# Patient Record
Sex: FEMALE | Race: OTHER | Employment: FULL TIME | URBAN - METROPOLITAN AREA
[De-identification: names, ages, dates, MRNs, and addresses within clinical notes are randomized per-mention and may not be internally consistent; named-entity substitution may affect disease eponyms.]

---

## 2021-01-31 ENCOUNTER — IMMUNIZATION (OUTPATIENT)
Dept: LAB | Facility: HOSPITAL | Age: 26
End: 2021-01-31
Attending: EMERGENCY MEDICINE
Payer: MEDICAID

## 2021-01-31 DIAGNOSIS — Z23 NEED FOR VACCINATION: Primary | ICD-10-CM

## 2021-01-31 PROCEDURE — 0011A SARSCOV2 VAC 100MCG/0.5ML IM: CPT

## 2021-02-28 ENCOUNTER — IMMUNIZATION (OUTPATIENT)
Dept: LAB | Facility: HOSPITAL | Age: 26
End: 2021-02-28
Attending: EMERGENCY MEDICINE
Payer: MEDICAID

## 2021-02-28 DIAGNOSIS — Z23 NEED FOR VACCINATION: Primary | ICD-10-CM

## 2021-02-28 PROCEDURE — 0012A SARSCOV2 VAC 100MCG/0.5ML IM: CPT

## 2021-10-29 ENCOUNTER — IMMUNIZATION (OUTPATIENT)
Dept: LAB | Facility: HOSPITAL | Age: 26
End: 2021-10-29
Attending: EMERGENCY MEDICINE
Payer: MEDICAID

## 2021-10-29 DIAGNOSIS — Z23 NEED FOR VACCINATION: Primary | ICD-10-CM

## 2021-10-29 PROCEDURE — 0064A SARSCOV2 VAC 50MCG/0.25ML IM: CPT

## 2022-09-19 ENCOUNTER — OFFICE VISIT (OUTPATIENT)
Dept: FAMILY MEDICINE CLINIC | Facility: CLINIC | Age: 27
End: 2022-09-19
Payer: COMMERCIAL

## 2022-09-19 VITALS
DIASTOLIC BLOOD PRESSURE: 60 MMHG | OXYGEN SATURATION: 100 % | SYSTOLIC BLOOD PRESSURE: 92 MMHG | HEART RATE: 84 BPM | TEMPERATURE: 98 F | HEIGHT: 66.5 IN | BODY MASS INDEX: 37.16 KG/M2 | RESPIRATION RATE: 18 BRPM | WEIGHT: 234 LBS

## 2022-09-19 DIAGNOSIS — H57.89 IRRITATION OF RIGHT EYE: ICD-10-CM

## 2022-09-19 DIAGNOSIS — H01.00A BLEPHARITIS OF BOTH UPPER AND LOWER EYELID OF RIGHT EYE, UNSPECIFIED TYPE: Primary | ICD-10-CM

## 2022-09-19 DIAGNOSIS — F41.9 ANXIETY: ICD-10-CM

## 2022-09-19 RX ORDER — OLOPATADINE HYDROCHLORIDE 1 MG/ML
1 SOLUTION/ DROPS OPHTHALMIC 2 TIMES DAILY
Qty: 5 ML | Refills: 0 | Status: SHIPPED | OUTPATIENT
Start: 2022-09-19

## 2022-11-19 ENCOUNTER — OFFICE VISIT (OUTPATIENT)
Dept: FAMILY MEDICINE CLINIC | Facility: CLINIC | Age: 27
End: 2022-11-19
Payer: COMMERCIAL

## 2022-11-19 VITALS
WEIGHT: 230 LBS | BODY MASS INDEX: 36.53 KG/M2 | OXYGEN SATURATION: 98 % | HEIGHT: 66.5 IN | RESPIRATION RATE: 18 BRPM | SYSTOLIC BLOOD PRESSURE: 98 MMHG | HEART RATE: 98 BPM | DIASTOLIC BLOOD PRESSURE: 62 MMHG

## 2022-11-19 DIAGNOSIS — H93.13 TINNITUS OF BOTH EARS: ICD-10-CM

## 2022-11-19 DIAGNOSIS — M25.562 CHRONIC PAIN OF LEFT KNEE: ICD-10-CM

## 2022-11-19 DIAGNOSIS — Z23 NEED FOR VACCINATION: ICD-10-CM

## 2022-11-19 DIAGNOSIS — N92.6 IRREGULAR PERIODS: ICD-10-CM

## 2022-11-19 DIAGNOSIS — Z00.00 ANNUAL PHYSICAL EXAM: Primary | ICD-10-CM

## 2022-11-19 DIAGNOSIS — Z30.431 IUD CHECK UP: ICD-10-CM

## 2022-11-19 DIAGNOSIS — G89.29 CHRONIC PAIN OF LEFT KNEE: ICD-10-CM

## 2022-11-19 DIAGNOSIS — Z00.00 LABORATORY EXAM ORDERED AS PART OF ROUTINE GENERAL MEDICAL EXAMINATION: ICD-10-CM

## 2022-11-19 PROCEDURE — 3008F BODY MASS INDEX DOCD: CPT | Performed by: STUDENT IN AN ORGANIZED HEALTH CARE EDUCATION/TRAINING PROGRAM

## 2022-11-19 PROCEDURE — 90686 IIV4 VACC NO PRSV 0.5 ML IM: CPT | Performed by: STUDENT IN AN ORGANIZED HEALTH CARE EDUCATION/TRAINING PROGRAM

## 2022-11-19 PROCEDURE — G0438 PPPS, INITIAL VISIT: HCPCS | Performed by: STUDENT IN AN ORGANIZED HEALTH CARE EDUCATION/TRAINING PROGRAM

## 2022-11-19 PROCEDURE — 3078F DIAST BP <80 MM HG: CPT | Performed by: STUDENT IN AN ORGANIZED HEALTH CARE EDUCATION/TRAINING PROGRAM

## 2022-11-19 PROCEDURE — 90471 IMMUNIZATION ADMIN: CPT | Performed by: STUDENT IN AN ORGANIZED HEALTH CARE EDUCATION/TRAINING PROGRAM

## 2022-11-19 PROCEDURE — 99395 PREV VISIT EST AGE 18-39: CPT | Performed by: STUDENT IN AN ORGANIZED HEALTH CARE EDUCATION/TRAINING PROGRAM

## 2022-11-19 PROCEDURE — 99213 OFFICE O/P EST LOW 20 MIN: CPT | Performed by: STUDENT IN AN ORGANIZED HEALTH CARE EDUCATION/TRAINING PROGRAM

## 2022-11-19 PROCEDURE — 3074F SYST BP LT 130 MM HG: CPT | Performed by: STUDENT IN AN ORGANIZED HEALTH CARE EDUCATION/TRAINING PROGRAM

## 2023-01-05 ENCOUNTER — HOSPITAL ENCOUNTER (OUTPATIENT)
Dept: ULTRASOUND IMAGING | Age: 28
Discharge: HOME | End: 2023-01-05
Attending: STUDENT IN AN ORGANIZED HEALTH CARE EDUCATION/TRAINING PROGRAM
Payer: COMMERCIAL

## 2023-01-05 ENCOUNTER — LABORATORY ENCOUNTER (OUTPATIENT)
Dept: LAB | Age: 28
End: 2023-01-05
Attending: STUDENT IN AN ORGANIZED HEALTH CARE EDUCATION/TRAINING PROGRAM
Payer: COMMERCIAL

## 2023-01-05 DIAGNOSIS — N92.6 IRREGULAR PERIODS: ICD-10-CM

## 2023-01-05 DIAGNOSIS — Z00.00 LABORATORY EXAM ORDERED AS PART OF ROUTINE GENERAL MEDICAL EXAMINATION: ICD-10-CM

## 2023-01-05 DIAGNOSIS — Z00.00 ANNUAL PHYSICAL EXAM: ICD-10-CM

## 2023-01-05 DIAGNOSIS — Z30.431 IUD CHECK UP: ICD-10-CM

## 2023-01-05 LAB
ALBUMIN SERPL-MCNC: 3.6 G/DL (ref 3.4–5)
ALBUMIN/GLOB SERPL: 0.8 {RATIO} (ref 1–2)
ALP LIVER SERPL-CCNC: 86 U/L
ALT SERPL-CCNC: 33 U/L
ANION GAP SERPL CALC-SCNC: 3 MMOL/L (ref 0–18)
AST SERPL-CCNC: 26 U/L (ref 15–37)
BASOPHILS # BLD AUTO: 0.05 X10(3) UL (ref 0–0.2)
BASOPHILS NFR BLD AUTO: 0.4 %
BILIRUB SERPL-MCNC: 0.3 MG/DL (ref 0.1–2)
BUN BLD-MCNC: 12 MG/DL (ref 7–18)
CALCIUM BLD-MCNC: 9.5 MG/DL (ref 8.5–10.1)
CHLORIDE SERPL-SCNC: 107 MMOL/L (ref 98–112)
CHOLEST SERPL-MCNC: 170 MG/DL (ref ?–200)
CO2 SERPL-SCNC: 27 MMOL/L (ref 21–32)
CREAT BLD-MCNC: 0.83 MG/DL
EOSINOPHIL # BLD AUTO: 0.2 X10(3) UL (ref 0–0.7)
EOSINOPHIL NFR BLD AUTO: 1.5 %
ERYTHROCYTE [DISTWIDTH] IN BLOOD BY AUTOMATED COUNT: 13.4 %
EST. AVERAGE GLUCOSE BLD GHB EST-MCNC: 120 MG/DL (ref 68–126)
FASTING PATIENT LIPID ANSWER: YES
FASTING STATUS PATIENT QL REPORTED: YES
GFR SERPLBLD BASED ON 1.73 SQ M-ARVRAT: 98 ML/MIN/1.73M2 (ref 60–?)
GLOBULIN PLAS-MCNC: 4.5 G/DL (ref 2.8–4.4)
GLUCOSE BLD-MCNC: 113 MG/DL (ref 70–99)
HBA1C MFR BLD: 5.8 % (ref ?–5.7)
HCT VFR BLD AUTO: 44.4 %
HDLC SERPL-MCNC: 65 MG/DL (ref 40–59)
HGB BLD-MCNC: 13.9 G/DL
IMM GRANULOCYTES # BLD AUTO: 0.05 X10(3) UL (ref 0–1)
IMM GRANULOCYTES NFR BLD: 0.4 %
LDLC SERPL CALC-MCNC: 94 MG/DL (ref ?–100)
LYMPHOCYTES # BLD AUTO: 4.53 X10(3) UL (ref 1–4)
LYMPHOCYTES NFR BLD AUTO: 34.6 %
MCH RBC QN AUTO: 28.5 PG (ref 26–34)
MCHC RBC AUTO-ENTMCNC: 31.3 G/DL (ref 31–37)
MCV RBC AUTO: 91 FL
MONOCYTES # BLD AUTO: 0.65 X10(3) UL (ref 0.1–1)
MONOCYTES NFR BLD AUTO: 5 %
NEUTROPHILS # BLD AUTO: 7.63 X10 (3) UL (ref 1.5–7.7)
NEUTROPHILS # BLD AUTO: 7.63 X10(3) UL (ref 1.5–7.7)
NEUTROPHILS NFR BLD AUTO: 58.1 %
NONHDLC SERPL-MCNC: 105 MG/DL (ref ?–130)
OSMOLALITY SERPL CALC.SUM OF ELEC: 285 MOSM/KG (ref 275–295)
PLATELET # BLD AUTO: 360 10(3)UL (ref 150–450)
POTASSIUM SERPL-SCNC: 3.8 MMOL/L (ref 3.5–5.1)
PROT SERPL-MCNC: 8.1 G/DL (ref 6.4–8.2)
RBC # BLD AUTO: 4.88 X10(6)UL
SODIUM SERPL-SCNC: 137 MMOL/L (ref 136–145)
TESTOST SERPL-MCNC: 16.67 NG/DL
TRIGL SERPL-MCNC: 54 MG/DL (ref 30–149)
TSI SER-ACNC: 1.75 MIU/ML (ref 0.36–3.74)
VLDLC SERPL CALC-MCNC: 9 MG/DL (ref 0–30)
WBC # BLD AUTO: 13.1 X10(3) UL (ref 4–11)

## 2023-01-05 PROCEDURE — 80061 LIPID PANEL: CPT

## 2023-01-05 PROCEDURE — 76830 TRANSVAGINAL US NON-OB: CPT | Performed by: STUDENT IN AN ORGANIZED HEALTH CARE EDUCATION/TRAINING PROGRAM

## 2023-01-05 PROCEDURE — 83498 ASY HYDROXYPROGESTERONE 17-D: CPT

## 2023-01-05 PROCEDURE — 84403 ASSAY OF TOTAL TESTOSTERONE: CPT

## 2023-01-05 PROCEDURE — 36415 COLL VENOUS BLD VENIPUNCTURE: CPT

## 2023-01-05 PROCEDURE — 80053 COMPREHEN METABOLIC PANEL: CPT

## 2023-01-05 PROCEDURE — 93975 VASCULAR STUDY: CPT | Performed by: STUDENT IN AN ORGANIZED HEALTH CARE EDUCATION/TRAINING PROGRAM

## 2023-01-05 PROCEDURE — 85025 COMPLETE CBC W/AUTO DIFF WBC: CPT

## 2023-01-05 PROCEDURE — 83036 HEMOGLOBIN GLYCOSYLATED A1C: CPT

## 2023-01-05 PROCEDURE — 84443 ASSAY THYROID STIM HORMONE: CPT

## 2023-01-05 PROCEDURE — 76856 US EXAM PELVIC COMPLETE: CPT | Performed by: STUDENT IN AN ORGANIZED HEALTH CARE EDUCATION/TRAINING PROGRAM

## 2023-01-06 DIAGNOSIS — D72.829 LEUKOCYTOSIS, UNSPECIFIED TYPE: Primary | ICD-10-CM

## 2023-01-10 LAB — 17-HYDROPROGESTERONE QNT: 224.83 NG/DL

## 2023-01-12 ENCOUNTER — OFFICE VISIT (OUTPATIENT)
Dept: FAMILY MEDICINE CLINIC | Facility: CLINIC | Age: 28
End: 2023-01-12
Payer: COMMERCIAL

## 2023-01-12 VITALS
RESPIRATION RATE: 18 BRPM | BODY MASS INDEX: 37.01 KG/M2 | SYSTOLIC BLOOD PRESSURE: 104 MMHG | DIASTOLIC BLOOD PRESSURE: 78 MMHG | HEART RATE: 78 BPM | HEIGHT: 66.5 IN | WEIGHT: 233 LBS | TEMPERATURE: 98 F | OXYGEN SATURATION: 98 %

## 2023-01-12 DIAGNOSIS — B37.9 YEAST INFECTION: Primary | ICD-10-CM

## 2023-01-12 RX ORDER — FLUCONAZOLE 150 MG/1
150 TABLET ORAL ONCE
Qty: 2 TABLET | Refills: 0 | Status: SHIPPED | OUTPATIENT
Start: 2023-01-12 | End: 2023-01-12

## 2023-02-14 ENCOUNTER — LAB ENCOUNTER (OUTPATIENT)
Dept: LAB | Age: 28
End: 2023-02-14
Attending: STUDENT IN AN ORGANIZED HEALTH CARE EDUCATION/TRAINING PROGRAM
Payer: COMMERCIAL

## 2023-02-14 DIAGNOSIS — D72.829 LEUKOCYTOSIS, UNSPECIFIED TYPE: ICD-10-CM

## 2023-02-14 LAB
BASOPHILS # BLD AUTO: 0.04 X10(3) UL (ref 0–0.2)
BASOPHILS NFR BLD AUTO: 0.4 %
EOSINOPHIL # BLD AUTO: 0.14 X10(3) UL (ref 0–0.7)
EOSINOPHIL NFR BLD AUTO: 1.4 %
ERYTHROCYTE [DISTWIDTH] IN BLOOD BY AUTOMATED COUNT: 13 %
HCT VFR BLD AUTO: 41.4 %
HGB BLD-MCNC: 13.4 G/DL
IMM GRANULOCYTES # BLD AUTO: 0.02 X10(3) UL (ref 0–1)
IMM GRANULOCYTES NFR BLD: 0.2 %
LYMPHOCYTES # BLD AUTO: 4.22 X10(3) UL (ref 1–4)
LYMPHOCYTES NFR BLD AUTO: 41.7 %
MCH RBC QN AUTO: 29.5 PG (ref 26–34)
MCHC RBC AUTO-ENTMCNC: 32.4 G/DL (ref 31–37)
MCV RBC AUTO: 91.2 FL
MONOCYTES # BLD AUTO: 0.45 X10(3) UL (ref 0.1–1)
MONOCYTES NFR BLD AUTO: 4.4 %
NEUTROPHILS # BLD AUTO: 5.25 X10 (3) UL (ref 1.5–7.7)
NEUTROPHILS # BLD AUTO: 5.25 X10(3) UL (ref 1.5–7.7)
NEUTROPHILS NFR BLD AUTO: 51.9 %
PLATELET # BLD AUTO: 347 10(3)UL (ref 150–450)
RBC # BLD AUTO: 4.54 X10(6)UL
WBC # BLD AUTO: 10.1 X10(3) UL (ref 4–11)

## 2023-02-14 PROCEDURE — 85025 COMPLETE CBC W/AUTO DIFF WBC: CPT

## 2023-02-14 PROCEDURE — 36415 COLL VENOUS BLD VENIPUNCTURE: CPT

## 2023-03-02 ENCOUNTER — TELEPHONE (OUTPATIENT)
Dept: FAMILY MEDICINE CLINIC | Facility: CLINIC | Age: 28
End: 2023-03-02

## 2023-03-02 NOTE — TELEPHONE ENCOUNTER
Sx started yesterday   Home test positive today   Congested   Sore threat   Mild cough  No fever   Interested in [facility] an appt with      CDC guidelines advised  Discussed available therapeutics   Advised supportive measures at home --rest, hydration, Vit D, Zinc, Vit C, lozenges for sore throat, warm salty gargles, hot tea with honey/lemon, Mucinex for cough as needed, anti histamine daily as needed, saline spray, humidifier, steam bath for congestion, Tylenol alternate Ibu if with fever and body aches as needed. With verbalized understanding. Go to ER if with chest pain, SOB, worsening fatigue, low oxygen level, with verbalized understanding.      Call update in 2 days

## 2023-03-02 NOTE — TELEPHONE ENCOUNTER
COVID + now at home. Symptoms:  Stuffy/running nose, headache, cough, feels warm. Pt asking for advice.

## 2023-03-03 ENCOUNTER — TELEMEDICINE (OUTPATIENT)
Dept: FAMILY MEDICINE CLINIC | Facility: CLINIC | Age: 28
End: 2023-03-03
Payer: COMMERCIAL

## 2023-03-03 DIAGNOSIS — U07.1 COVID: Primary | ICD-10-CM

## 2023-03-03 PROCEDURE — 99213 OFFICE O/P EST LOW 20 MIN: CPT | Performed by: STUDENT IN AN ORGANIZED HEALTH CARE EDUCATION/TRAINING PROGRAM

## 2023-03-03 RX ORDER — FLUTICASONE PROPIONATE 50 MCG
2 SPRAY, SUSPENSION (ML) NASAL DAILY PRN
Qty: 16 G | Refills: 0 | Status: SHIPPED | OUTPATIENT
Start: 2023-03-03

## 2023-03-03 RX ORDER — AZELASTINE 1 MG/ML
2 SPRAY, METERED NASAL NIGHTLY PRN
Qty: 30 ML | Refills: 0 | Status: SHIPPED | OUTPATIENT
Start: 2023-03-03

## 2023-04-11 ENCOUNTER — OFFICE VISIT (OUTPATIENT)
Facility: CLINIC | Age: 28
End: 2023-04-11
Payer: COMMERCIAL

## 2023-04-11 VITALS
SYSTOLIC BLOOD PRESSURE: 112 MMHG | HEART RATE: 91 BPM | WEIGHT: 229 LBS | HEIGHT: 66.5 IN | DIASTOLIC BLOOD PRESSURE: 68 MMHG | BODY MASS INDEX: 36.37 KG/M2

## 2023-04-11 DIAGNOSIS — Z01.419 ENCOUNTER FOR ANNUAL ROUTINE GYNECOLOGICAL EXAMINATION: Primary | ICD-10-CM

## 2023-04-11 DIAGNOSIS — T83.32XA INTRAUTERINE CONTRACEPTIVE DEVICE THREADS LOST, INITIAL ENCOUNTER: ICD-10-CM

## 2023-04-11 DIAGNOSIS — N76.0 VAGINITIS AND VULVOVAGINITIS: ICD-10-CM

## 2023-04-11 PROCEDURE — 87491 CHLMYD TRACH DNA AMP PROBE: CPT | Performed by: STUDENT IN AN ORGANIZED HEALTH CARE EDUCATION/TRAINING PROGRAM

## 2023-04-11 PROCEDURE — 87660 TRICHOMONAS VAGIN DIR PROBE: CPT | Performed by: STUDENT IN AN ORGANIZED HEALTH CARE EDUCATION/TRAINING PROGRAM

## 2023-04-11 PROCEDURE — 87480 CANDIDA DNA DIR PROBE: CPT | Performed by: STUDENT IN AN ORGANIZED HEALTH CARE EDUCATION/TRAINING PROGRAM

## 2023-04-11 PROCEDURE — 87591 N.GONORRHOEAE DNA AMP PROB: CPT | Performed by: STUDENT IN AN ORGANIZED HEALTH CARE EDUCATION/TRAINING PROGRAM

## 2023-04-11 PROCEDURE — 87624 HPV HI-RISK TYP POOLED RSLT: CPT | Performed by: STUDENT IN AN ORGANIZED HEALTH CARE EDUCATION/TRAINING PROGRAM

## 2023-04-11 PROCEDURE — 87510 GARDNER VAG DNA DIR PROBE: CPT | Performed by: STUDENT IN AN ORGANIZED HEALTH CARE EDUCATION/TRAINING PROGRAM

## 2023-04-12 LAB
C TRACH DNA SPEC QL NAA+PROBE: NEGATIVE
HPV I/H RISK 1 DNA SPEC QL NAA+PROBE: NEGATIVE
N GONORRHOEA DNA SPEC QL NAA+PROBE: NEGATIVE

## 2023-04-14 RX ORDER — METRONIDAZOLE 7.5 MG/G
1 GEL VAGINAL NIGHTLY
Qty: 70 G | Refills: 0 | Status: SHIPPED | OUTPATIENT
Start: 2023-04-14 | End: 2023-04-19

## 2023-04-19 ENCOUNTER — TELEPHONE (OUTPATIENT)
Dept: FAMILY MEDICINE CLINIC | Facility: CLINIC | Age: 28
End: 2023-04-19

## 2023-04-19 ENCOUNTER — APPOINTMENT (OUTPATIENT)
Dept: ULTRASOUND IMAGING | Age: 28
End: 2023-04-19
Attending: NURSE PRACTITIONER
Payer: COMMERCIAL

## 2023-04-19 ENCOUNTER — HOSPITAL ENCOUNTER (OUTPATIENT)
Age: 28
Discharge: HOME | End: 2023-04-19
Payer: COMMERCIAL

## 2023-04-19 VITALS
WEIGHT: 229 LBS | SYSTOLIC BLOOD PRESSURE: 120 MMHG | BODY MASS INDEX: 36.37 KG/M2 | DIASTOLIC BLOOD PRESSURE: 79 MMHG | OXYGEN SATURATION: 99 % | HEART RATE: 89 BPM | HEIGHT: 66.5 IN | TEMPERATURE: 98 F | RESPIRATION RATE: 16 BRPM

## 2023-04-19 DIAGNOSIS — M79.89 LEG SWELLING: Primary | ICD-10-CM

## 2023-04-19 PROCEDURE — 93971 EXTREMITY STUDY: CPT | Performed by: NURSE PRACTITIONER

## 2023-04-19 PROCEDURE — 99214 OFFICE O/P EST MOD 30 MIN: CPT

## 2023-04-19 PROCEDURE — 99203 OFFICE O/P NEW LOW 30 MIN: CPT

## 2023-04-19 NOTE — TELEPHONE ENCOUNTER
Below knee/ankle/foot swollen and puffy last night; has subsided today. Pt was on an airplane for about 4 hours yesterday.

## 2023-04-19 NOTE — TELEPHONE ENCOUNTER
Urgent care for evaluation for DVT with ultrasound, may need antibiotics if they note any signs of cellulitis.      Ap Butts MD, 04/19/23, 10:41 AM

## 2023-04-19 NOTE — ED INITIAL ASSESSMENT (HPI)
Patient reports she returned from a flight yesterday from [place] and noticed bilateral ankle swelling. Patient reports the right was more swollen.

## 2023-04-19 NOTE — TELEPHONE ENCOUNTER
Since last night   R leg swelling   L leg minimal   No redness   Feels warm R leg last night  No pain , no calf pain when stretched or when walking     Air flight Sat 4/15  2 hrs    Flight 4 hrs last night    With IUD  Since 12/30/21   No oral pills     No SOB, chest pain or palpitations  No hx of blood clots in the past    Are you able to see/add on today, (her day off) or UC today?      Pls advise   Thank you

## 2023-04-24 ENCOUNTER — TELEPHONE (OUTPATIENT)
Facility: CLINIC | Age: 28
End: 2023-04-24

## 2023-04-24 ENCOUNTER — LAB ENCOUNTER (OUTPATIENT)
Dept: LAB | Age: 28
End: 2023-04-24
Attending: STUDENT IN AN ORGANIZED HEALTH CARE EDUCATION/TRAINING PROGRAM
Payer: COMMERCIAL

## 2023-04-24 DIAGNOSIS — R35.0 URINARY FREQUENCY: Primary | ICD-10-CM

## 2023-04-24 DIAGNOSIS — R35.0 URINARY FREQUENCY: ICD-10-CM

## 2023-04-24 LAB
BILIRUB UR QL STRIP.AUTO: NEGATIVE
COLOR UR AUTO: YELLOW
GLUCOSE UR STRIP.AUTO-MCNC: NEGATIVE MG/DL
KETONES UR STRIP.AUTO-MCNC: NEGATIVE MG/DL
NITRITE UR QL STRIP.AUTO: NEGATIVE
PH UR STRIP.AUTO: 5 [PH] (ref 5–8)
PROT UR STRIP.AUTO-MCNC: NEGATIVE MG/DL
SP GR UR STRIP.AUTO: 1.01 (ref 1–1.03)
UROBILINOGEN UR STRIP.AUTO-MCNC: <2 MG/DL
WBC #/AREA URNS AUTO: >50 /HPF
WBC CLUMPS UR QL AUTO: PRESENT /HPF

## 2023-04-24 PROCEDURE — 87077 CULTURE AEROBIC IDENTIFY: CPT

## 2023-04-24 PROCEDURE — 81001 URINALYSIS AUTO W/SCOPE: CPT

## 2023-04-24 PROCEDURE — 87186 SC STD MICRODIL/AGAR DIL: CPT

## 2023-04-24 PROCEDURE — 87086 URINE CULTURE/COLONY COUNT: CPT

## 2023-04-24 NOTE — TELEPHONE ENCOUNTER
Spoke with pt. She completed Metrogel treatment last night. She C/O an odor to her urine and urinary frequency. Offered pt an appointment this afternoon. Unable to come in due to her work schedule. No available appointments available tomorrow that work with her work schedule. I let pt know I would get a message to Dr. Li So to see if pt could go by an [facility] lab to drop of a urine specimen and call back with recommendations. Orders pended. Verbalized understanding.

## 2023-04-24 NOTE — TELEPHONE ENCOUNTER
Spoke with pt. Aware she can give urine sample, orders have been signed. May still take time for metrogel to fully resolve symptoms. If urine culture negative and still symptomatic can also try PO flagyl instead of Metrogel. Verbalized understanding.

## 2023-04-24 NOTE — TELEPHONE ENCOUNTER
Per pt she just called because she has finished her meds and still is experiencing some itching, and also some change of odor in her urine. She is not sure if Dr DUNBAR wants to see her or not. Please advise and call pt.  Thanks

## 2023-04-25 RX ORDER — NITROFURANTOIN 25; 75 MG/1; MG/1
100 CAPSULE ORAL 2 TIMES DAILY
Qty: 14 CAPSULE | Refills: 0 | Status: SHIPPED | OUTPATIENT
Start: 2023-04-25 | End: 2023-05-02

## 2023-04-26 NOTE — PROGRESS NOTES
Discussed providers message: results and recommendation Rx macrobid, patient verbalized understanding.

## 2023-06-10 ENCOUNTER — HOSPITAL ENCOUNTER (OUTPATIENT)
Age: 28
Discharge: HOME | End: 2023-06-10
Payer: COMMERCIAL

## 2023-06-10 VITALS
DIASTOLIC BLOOD PRESSURE: 70 MMHG | OXYGEN SATURATION: 96 % | SYSTOLIC BLOOD PRESSURE: 139 MMHG | HEART RATE: 106 BPM | RESPIRATION RATE: 18 BRPM | WEIGHT: 230 LBS | BODY MASS INDEX: 37 KG/M2 | TEMPERATURE: 97 F

## 2023-06-10 DIAGNOSIS — J01.90 ACUTE SINUSITIS, RECURRENCE NOT SPECIFIED, UNSPECIFIED LOCATION: Primary | ICD-10-CM

## 2023-06-10 DIAGNOSIS — J34.89 SINUS PRESSURE: ICD-10-CM

## 2023-06-10 DIAGNOSIS — R09.81 SINUS CONGESTION: ICD-10-CM

## 2023-06-10 PROCEDURE — 99213 OFFICE O/P EST LOW 20 MIN: CPT

## 2023-06-10 NOTE — DISCHARGE INSTRUCTIONS
See attached information    Wash hands often  Disinfect your environment, linens, electronics, etc.  Drink plenty of fluids (water, Pedialyte, etc.)  Get plenty of rest  Do not share utensils or drinks  Salt water gargles throughout the day for sore throat  You may benefit from spoonfuls of honey throughout the day for cough  You may benefit from taking a decongestant (e.g. Sudafed - pseudoephedrine [behind the pharmacy counter])  You may benefit from using a humidifier and/or steam showers  You may benefit from Flonase nasal spray daily  You may benefit from taking Zyrtec daily until Flonase has been used for 1-2 weeks.   Avoid having air blow on your face as this can worsen congestion  Symptoms may take a few weeks to resolve

## 2023-06-10 NOTE — ED INITIAL ASSESSMENT (HPI)
C/o productive cough with green sputum for over 11 days. Pt reports runny/stiffy nose. Pt reports nasal congestion and bilateral ear pressure. Pt reports otc meds for symptoms.

## 2023-06-17 ENCOUNTER — TELEPHONE (OUTPATIENT)
Dept: FAMILY MEDICINE CLINIC | Facility: CLINIC | Age: 28
End: 2023-06-17

## 2023-06-22 ENCOUNTER — OFFICE VISIT (OUTPATIENT)
Dept: FAMILY MEDICINE CLINIC | Facility: CLINIC | Age: 28
End: 2023-06-22
Payer: COMMERCIAL

## 2023-06-22 VITALS
OXYGEN SATURATION: 98 % | BODY MASS INDEX: 36.37 KG/M2 | DIASTOLIC BLOOD PRESSURE: 70 MMHG | HEART RATE: 92 BPM | SYSTOLIC BLOOD PRESSURE: 110 MMHG | HEIGHT: 66.5 IN | WEIGHT: 229 LBS | RESPIRATION RATE: 19 BRPM

## 2023-06-22 DIAGNOSIS — B37.9 YEAST INFECTION: ICD-10-CM

## 2023-06-22 DIAGNOSIS — R30.0 DYSURIA: ICD-10-CM

## 2023-06-22 DIAGNOSIS — N89.8 VAGINAL DISCHARGE: Primary | ICD-10-CM

## 2023-06-22 PROCEDURE — 87086 URINE CULTURE/COLONY COUNT: CPT | Performed by: STUDENT IN AN ORGANIZED HEALTH CARE EDUCATION/TRAINING PROGRAM

## 2023-06-22 PROCEDURE — 87480 CANDIDA DNA DIR PROBE: CPT | Performed by: STUDENT IN AN ORGANIZED HEALTH CARE EDUCATION/TRAINING PROGRAM

## 2023-06-22 PROCEDURE — 3074F SYST BP LT 130 MM HG: CPT | Performed by: STUDENT IN AN ORGANIZED HEALTH CARE EDUCATION/TRAINING PROGRAM

## 2023-06-22 PROCEDURE — 87660 TRICHOMONAS VAGIN DIR PROBE: CPT | Performed by: STUDENT IN AN ORGANIZED HEALTH CARE EDUCATION/TRAINING PROGRAM

## 2023-06-22 PROCEDURE — 87510 GARDNER VAG DNA DIR PROBE: CPT | Performed by: STUDENT IN AN ORGANIZED HEALTH CARE EDUCATION/TRAINING PROGRAM

## 2023-06-22 PROCEDURE — 3078F DIAST BP <80 MM HG: CPT | Performed by: STUDENT IN AN ORGANIZED HEALTH CARE EDUCATION/TRAINING PROGRAM

## 2023-06-22 PROCEDURE — 99213 OFFICE O/P EST LOW 20 MIN: CPT | Performed by: STUDENT IN AN ORGANIZED HEALTH CARE EDUCATION/TRAINING PROGRAM

## 2023-06-22 PROCEDURE — 3008F BODY MASS INDEX DOCD: CPT | Performed by: STUDENT IN AN ORGANIZED HEALTH CARE EDUCATION/TRAINING PROGRAM

## 2023-06-22 RX ORDER — FLUCONAZOLE 150 MG/1
150 TABLET ORAL ONCE
Qty: 2 TABLET | Refills: 0 | Status: SHIPPED | OUTPATIENT
Start: 2023-06-22 | End: 2023-06-22

## 2023-06-27 ENCOUNTER — TELEPHONE (OUTPATIENT)
Dept: FAMILY MEDICINE CLINIC | Facility: CLINIC | Age: 28
End: 2023-06-27

## 2023-10-28 ENCOUNTER — HOSPITAL ENCOUNTER (OUTPATIENT)
Age: 28
Discharge: HOME | End: 2023-10-28
Payer: COMMERCIAL

## 2023-10-28 ENCOUNTER — HOSPITAL ENCOUNTER (OUTPATIENT)
Dept: GENERAL RADIOLOGY | Age: 28
Discharge: HOME | End: 2023-10-28
Attending: STUDENT IN AN ORGANIZED HEALTH CARE EDUCATION/TRAINING PROGRAM

## 2023-10-28 VITALS
TEMPERATURE: 97 F | HEART RATE: 74 BPM | RESPIRATION RATE: 20 BRPM | BODY MASS INDEX: 36.48 KG/M2 | DIASTOLIC BLOOD PRESSURE: 66 MMHG | WEIGHT: 227 LBS | HEIGHT: 66 IN | OXYGEN SATURATION: 99 % | SYSTOLIC BLOOD PRESSURE: 106 MMHG

## 2023-10-28 DIAGNOSIS — G89.29 CHRONIC PAIN OF LEFT KNEE: ICD-10-CM

## 2023-10-28 DIAGNOSIS — B37.31 VAGINAL CANDIDIASIS: Primary | ICD-10-CM

## 2023-10-28 DIAGNOSIS — M25.562 CHRONIC PAIN OF LEFT KNEE: ICD-10-CM

## 2023-10-28 LAB
B-HCG UR QL: NEGATIVE
BILIRUB UR QL STRIP: NEGATIVE
CLARITY UR: CLEAR
COLOR UR: YELLOW
GLUCOSE UR STRIP-MCNC: NEGATIVE MG/DL
KETONES UR STRIP-MCNC: NEGATIVE MG/DL
LEUKOCYTE ESTERASE UR QL STRIP: NEGATIVE
NITRITE UR QL STRIP: NEGATIVE
PH UR STRIP: 6 [PH]
PROT UR STRIP-MCNC: NEGATIVE MG/DL
SP GR UR STRIP: >=1.03
UROBILINOGEN UR STRIP-ACNC: <2 MG/DL

## 2023-10-28 PROCEDURE — 87660 TRICHOMONAS VAGIN DIR PROBE: CPT | Performed by: PHYSICIAN ASSISTANT

## 2023-10-28 PROCEDURE — 99214 OFFICE O/P EST MOD 30 MIN: CPT

## 2023-10-28 PROCEDURE — 81002 URINALYSIS NONAUTO W/O SCOPE: CPT

## 2023-10-28 PROCEDURE — 87491 CHLMYD TRACH DNA AMP PROBE: CPT | Performed by: PHYSICIAN ASSISTANT

## 2023-10-28 PROCEDURE — 87510 GARDNER VAG DNA DIR PROBE: CPT | Performed by: PHYSICIAN ASSISTANT

## 2023-10-28 PROCEDURE — 73562 X-RAY EXAM OF KNEE 3: CPT | Performed by: STUDENT IN AN ORGANIZED HEALTH CARE EDUCATION/TRAINING PROGRAM

## 2023-10-28 PROCEDURE — 81025 URINE PREGNANCY TEST: CPT

## 2023-10-28 PROCEDURE — 87480 CANDIDA DNA DIR PROBE: CPT | Performed by: PHYSICIAN ASSISTANT

## 2023-10-28 PROCEDURE — 87591 N.GONORRHOEAE DNA AMP PROB: CPT | Performed by: PHYSICIAN ASSISTANT

## 2023-10-28 RX ORDER — FLUCONAZOLE 150 MG/1
TABLET ORAL
Qty: 2 TABLET | Refills: 0 | Status: SHIPPED | OUTPATIENT
Start: 2023-10-28

## 2023-10-28 RX ORDER — NYSTATIN 100000 U/G
1 OINTMENT TOPICAL 2 TIMES DAILY
Qty: 30 G | Refills: 0 | Status: SHIPPED | OUTPATIENT
Start: 2023-10-28

## 2023-10-28 NOTE — DISCHARGE INSTRUCTIONS
NO Sexual intercourse until results are received and all symptoms have resolved    Start on medication as prescribed

## 2023-10-30 LAB
C TRACH DNA SPEC QL NAA+PROBE: NEGATIVE
N GONORRHOEA DNA SPEC QL NAA+PROBE: NEGATIVE

## 2024-02-02 PROBLEM — R73.9 HYPERGLYCEMIA: Status: ACTIVE | Noted: 2019-03-26

## 2024-02-02 PROBLEM — R30.0 DYSURIA: Status: ACTIVE | Noted: 2018-06-02

## 2024-02-02 PROBLEM — N76.0 ACUTE VAGINITIS: Status: ACTIVE | Noted: 2018-06-02

## 2024-02-03 ENCOUNTER — OFFICE VISIT (OUTPATIENT)
Dept: FAMILY MEDICINE CLINIC | Facility: CLINIC | Age: 29
End: 2024-02-03
Payer: COMMERCIAL

## 2024-02-03 VITALS
HEIGHT: 66 IN | DIASTOLIC BLOOD PRESSURE: 66 MMHG | WEIGHT: 222 LBS | BODY MASS INDEX: 35.68 KG/M2 | RESPIRATION RATE: 19 BRPM | HEART RATE: 90 BPM | OXYGEN SATURATION: 99 % | SYSTOLIC BLOOD PRESSURE: 110 MMHG

## 2024-02-03 DIAGNOSIS — G43.829 PREMENSTRUAL MIGRAINE: ICD-10-CM

## 2024-02-03 DIAGNOSIS — R06.83 SNORING: ICD-10-CM

## 2024-02-03 DIAGNOSIS — Z00.00 LABORATORY EXAM ORDERED AS PART OF ROUTINE GENERAL MEDICAL EXAMINATION: ICD-10-CM

## 2024-02-03 DIAGNOSIS — Z23 NEED FOR VACCINATION: ICD-10-CM

## 2024-02-03 DIAGNOSIS — M25.562 CHRONIC PAIN OF LEFT KNEE: ICD-10-CM

## 2024-02-03 DIAGNOSIS — Z00.00 ANNUAL PHYSICAL EXAM: Primary | ICD-10-CM

## 2024-02-03 DIAGNOSIS — G89.29 CHRONIC PAIN OF LEFT KNEE: ICD-10-CM

## 2024-02-03 LAB
ALBUMIN SERPL-MCNC: 4.2 G/DL (ref 3.4–5)
ALBUMIN/GLOB SERPL: 0.9 {RATIO} (ref 1–2)
ALP LIVER SERPL-CCNC: 82 U/L
ALT SERPL-CCNC: 22 U/L
ANION GAP SERPL CALC-SCNC: 5 MMOL/L (ref 0–18)
AST SERPL-CCNC: 23 U/L (ref 15–37)
BASOPHILS # BLD AUTO: 0.04 X10(3) UL (ref 0–0.2)
BASOPHILS NFR BLD AUTO: 0.5 %
BILIRUB SERPL-MCNC: 0.5 MG/DL (ref 0.1–2)
BUN BLD-MCNC: 10 MG/DL (ref 9–23)
CALCIUM BLD-MCNC: 9.7 MG/DL (ref 8.5–10.1)
CHLORIDE SERPL-SCNC: 108 MMOL/L (ref 98–112)
CHOLEST SERPL-MCNC: 193 MG/DL (ref ?–200)
CO2 SERPL-SCNC: 23 MMOL/L (ref 21–32)
CREAT BLD-MCNC: 0.81 MG/DL
EGFRCR SERPLBLD CKD-EPI 2021: 100 ML/MIN/1.73M2 (ref 60–?)
EOSINOPHIL # BLD AUTO: 0.14 X10(3) UL (ref 0–0.7)
EOSINOPHIL NFR BLD AUTO: 1.8 %
ERYTHROCYTE [DISTWIDTH] IN BLOOD BY AUTOMATED COUNT: 12.9 %
EST. AVERAGE GLUCOSE BLD GHB EST-MCNC: 126 MG/DL (ref 68–126)
FASTING PATIENT LIPID ANSWER: YES
FASTING STATUS PATIENT QL REPORTED: YES
GLOBULIN PLAS-MCNC: 4.5 G/DL (ref 2.8–4.4)
GLUCOSE BLD-MCNC: 101 MG/DL (ref 70–99)
HBA1C MFR BLD: 6 % (ref ?–5.7)
HCT VFR BLD AUTO: 46.3 %
HDLC SERPL-MCNC: 71 MG/DL (ref 40–59)
HGB BLD-MCNC: 15.2 G/DL
IMM GRANULOCYTES # BLD AUTO: 0.02 X10(3) UL (ref 0–1)
IMM GRANULOCYTES NFR BLD: 0.3 %
LDLC SERPL CALC-MCNC: 112 MG/DL (ref ?–100)
LYMPHOCYTES # BLD AUTO: 3.85 X10(3) UL (ref 1–4)
LYMPHOCYTES NFR BLD AUTO: 48.2 %
MCH RBC QN AUTO: 29 PG (ref 26–34)
MCHC RBC AUTO-ENTMCNC: 32.8 G/DL (ref 31–37)
MCV RBC AUTO: 88.2 FL
MONOCYTES # BLD AUTO: 0.35 X10(3) UL (ref 0.1–1)
MONOCYTES NFR BLD AUTO: 4.4 %
NEUTROPHILS # BLD AUTO: 3.58 X10 (3) UL (ref 1.5–7.7)
NEUTROPHILS # BLD AUTO: 3.58 X10(3) UL (ref 1.5–7.7)
NEUTROPHILS NFR BLD AUTO: 44.8 %
NONHDLC SERPL-MCNC: 122 MG/DL (ref ?–130)
OSMOLALITY SERPL CALC.SUM OF ELEC: 281 MOSM/KG (ref 275–295)
PLATELET # BLD AUTO: 369 10(3)UL (ref 150–450)
POTASSIUM SERPL-SCNC: 4 MMOL/L (ref 3.5–5.1)
PROT SERPL-MCNC: 8.7 G/DL (ref 6.4–8.2)
RBC # BLD AUTO: 5.25 X10(6)UL
SODIUM SERPL-SCNC: 136 MMOL/L (ref 136–145)
TRIGL SERPL-MCNC: 51 MG/DL (ref 30–149)
TSI SER-ACNC: 1.21 MIU/ML (ref 0.36–3.74)
VLDLC SERPL CALC-MCNC: 9 MG/DL (ref 0–30)
WBC # BLD AUTO: 8 X10(3) UL (ref 4–11)

## 2024-02-03 PROCEDURE — 84443 ASSAY THYROID STIM HORMONE: CPT | Performed by: STUDENT IN AN ORGANIZED HEALTH CARE EDUCATION/TRAINING PROGRAM

## 2024-02-03 PROCEDURE — 80061 LIPID PANEL: CPT | Performed by: STUDENT IN AN ORGANIZED HEALTH CARE EDUCATION/TRAINING PROGRAM

## 2024-02-03 PROCEDURE — 83036 HEMOGLOBIN GLYCOSYLATED A1C: CPT | Performed by: STUDENT IN AN ORGANIZED HEALTH CARE EDUCATION/TRAINING PROGRAM

## 2024-02-03 PROCEDURE — 80053 COMPREHEN METABOLIC PANEL: CPT | Performed by: STUDENT IN AN ORGANIZED HEALTH CARE EDUCATION/TRAINING PROGRAM

## 2024-02-03 PROCEDURE — 85025 COMPLETE CBC W/AUTO DIFF WBC: CPT | Performed by: STUDENT IN AN ORGANIZED HEALTH CARE EDUCATION/TRAINING PROGRAM

## 2024-02-03 NOTE — PROGRESS NOTES
[facility] Office Note    HPI:     Zak Malave is a 28 year old female presenting for annual exam and follow-up of knee pain, pre-menstrual migraines, and snoring.     Pre-menstrual migraines  Has noted migraines more often with Kyleena IUD, particularly around her periods. Described as pulsatile bilateral head pressure like sensation with sensitivity to light.    Chronic left knee pain  Patient also following-up with knee pain. Prior x-rays negative. Patient endorses that the knee still feels \"crunchy\" and has been progressively worse. Patient did not f/u with physical therapy referral previously provided.     Snoring  Has been told by both her boyfriend and her mother that she snores heavily. Her boyfriend has apparently noted that she also has had times where it seems like her breathing has stopped during sleep.     Diet: cutting down on sugary beverages, 1-2 servings of veggies per day, lean proteins   Exercise: no regular exercise currently  Tobacco: denies   Alcohol: 1-2 drinks in a month   Other Drugs: denies     Aspirin use? (age 50-59 with ASCVD risk 10% or greater): not indicated  Breast cancer (biennial age 50-74, individualized choice age 40-49): not indicated  Cervical cancer screen? (q3 yrs age 21-29, q3 or q5 w/HPV cotesting age 30-65): sees gyne   Colonoscopy screen? (age 45-75 or earlier based on risk): not indicated  Depression screen? (PHQ-2 or PHQ-9): PHQ-2 Score of 0  Diabetes screen? (age 35 to 70 who are overweight or obese): A1c ordered  Fall Prevention? (age 65 and older): not indicated  Lipid panel? (age 20-45 with increased risk of CHD or older than 45): ordered  Lung cancer screen if 50-80 w/ 20 pack year smoking history and currently smoking or quit in last 15 yrs? not indicated  Osteoporosis screen? (DEXA in postmenopausal 65 or older at increased risk): not indicated    HISTORY:  Past Medical History:   Diagnosis Date    Fracture of left leg     at age  7-8      Past Surgical History:   Procedure Laterality Date    APPENDECTOMY      done as child around age 5 or 6    WISDOM TEETH REMOVED        Family History   Problem Relation Age of Onset    Heart Disease Father     Glaucoma Father     Depression Mother     Other (Rheumatoid arthritis) Maternal Grandmother     Prostate Cancer Maternal Grandfather         dx age 60s    Glaucoma Paternal Grandmother     Diabetes Paternal Grandfather     Breast Cancer Neg     Ovarian Cancer Neg     Uterine Cancer Neg     Pancreatic Cancer Neg     Colon Cancer Neg       Social History:   Social History     Socioeconomic History    Marital status: Single   Tobacco Use    Smoking status: Never     Passive exposure: Never    Smokeless tobacco: Never   Vaping Use    Vaping Use: Never used   Substance and Sexual Activity    Alcohol use: Yes     Comment: social    Drug use: Never    Sexual activity: Yes     Partners: Male        Medications (Active prior to today's visit):  Current Outpatient Medications   Medication Sig Dispense Refill    Levonorgestrel (KYLEENA IU) by Intrauterine route.      fluconazole 150 MG Oral Tab Take one tab day 1 if still symptomatic take second tab in 72 hours (Patient not taking: Reported on 2/3/2024) 2 tablet 0    nystatin 100,000 Units/g External Ointment Apply 1 Application topically 2 (two) times daily. (Patient not taking: Reported on 2/3/2024) 30 g 0       Allergies:  Allergies   Allergen Reactions    Seasonal OTHER (SEE COMMENTS)     sneezing         ROS:   Review of Systems   Constitutional:  Negative for chills and fever.   Respiratory:          +snoring during sleep   Musculoskeletal:         +chronic left knee pain   Neurological:         +pre-menstrual migraines     Otherwise see HPI    PHYSICAL EXAM:   /66 (BP Location: Right arm, Patient Position: Sitting, Cuff Size: large)   Pulse 90   Resp 19   Ht 5' 6\" (1.676 m)   Wt 222 lb (100.7 kg)   LMP 01/28/2024 (Approximate)   SpO2 99%    BMI 35.83 kg/m²  Estimated body mass index is 35.83 kg/m² as calculated from the following:    Height as of this encounter: 5' 6\" (1.676 m).    Weight as of this encounter: 222 lb (100.7 kg).   Vital signs reviewed.Appears stated age, well groomed.    Physical Exam  Constitutional:       General: She is not in acute distress.  HENT:      Right Ear: External ear normal.      Left Ear: External ear normal.      Nose: Nose normal.      Mouth/Throat:      Mouth: Mucous membranes are moist.      Pharynx: Oropharynx is clear.   Eyes:      Conjunctiva/sclera: Conjunctivae normal.      Pupils: Pupils are equal, round, and reactive to light.   Cardiovascular:      Rate and Rhythm: Normal rate and regular rhythm.      Heart sounds: Normal heart sounds.   Pulmonary:      Effort: Pulmonary effort is normal.      Breath sounds: Normal breath sounds.   Abdominal:      General: Bowel sounds are normal.      Palpations: Abdomen is soft.      Tenderness: There is no abdominal tenderness. There is no guarding or rebound.   Musculoskeletal:      Comments: +No tenderness to palpation of left knee. No significant edema appreciated of knee. ROM of knee intact. Negative anterior and posterior Lachman's test. Overall negative Juan's test but patient did note some \"popping\" and \"catching\" sensation when performed with internal rotation.      Lymphadenopathy:      Cervical: No cervical adenopathy.   Neurological:      Mental Status: She is alert.           ASSESSMENT/PLAN:      28 year old female presenting for annual exam and follow-up of knee pain, pre-menstrual migraines, and snoring.       1. Annual physical exam  - encourage well-balanced diet with fruits/vegetables, limited fried/fatty foods, and limited take-out   - encourage at least 150 minutes of moderate intensity aerobic activity weekly     2. Laboratory exam ordered as part of routine general medical examination  - CBC W Differential W Platelet [E]; Future  - Comp Metabolic  Panel (14) [E]; Future  - Lipid Panel [E]; Future  - TSH W Reflex To Free T4 [E]; Future  - Hemoglobin A1C [E]; Future    3. Need for vaccination  - Human Papillomavirus 9-valent vaccine, Recombinant (Gardasil 9) HPV 9 [88452]  - INFLUENZA VAC, QUAD, PRSV FREE, 0.5 ML    4. Chronic pain of left knee  - likely more strain of meniscus, recommend physical therapy and if no improvement can get MRI   - Physical Therapy Referral - [facility]    5. Premenstrual migraine  - may be related to Kyleena IUD (according to some literature headaches can be present in up to 13% of patients on this IUD with 3% with migraines)  - patient to f/u with gyne    6. Snoring  - needs sleep study, provided referral to sleep center     Follow-up: as needed    Outcome: Patient verbalizes understanding. Patient is notified to call with any questions, complications, allergies, or worsening or changing symptoms.  Patient is to call with any side effects or complications from the treatments as a result of today.     Total length of visit/charting: approximately 30 min    Charly Schmitz MD, 02/03/24, 10:07 AM      Please note that portions of this note may have been completed with a voice recognition program. Efforts were made to edit the dictations but occasionally words are mis-transcribed.

## 2024-02-05 DIAGNOSIS — R77.8 ELEVATED TOTAL PROTEIN: Primary | ICD-10-CM

## 2024-02-07 ENCOUNTER — TELEPHONE (OUTPATIENT)
Dept: FAMILY MEDICINE CLINIC | Facility: CLINIC | Age: 29
End: 2024-02-07

## 2024-02-07 NOTE — TELEPHONE ENCOUNTER
Calling back re: test results.  Will be home till 1:00 pm today.    Detailed vm is fine with patient if she is not available.

## 2024-03-05 ENCOUNTER — LAB ENCOUNTER (OUTPATIENT)
Dept: LAB | Age: 29
End: 2024-03-05
Attending: STUDENT IN AN ORGANIZED HEALTH CARE EDUCATION/TRAINING PROGRAM
Payer: COMMERCIAL

## 2024-03-05 DIAGNOSIS — R77.8 ELEVATED TOTAL PROTEIN: ICD-10-CM

## 2024-03-05 PROCEDURE — 36415 COLL VENOUS BLD VENIPUNCTURE: CPT

## 2024-03-05 PROCEDURE — 80053 COMPREHEN METABOLIC PANEL: CPT

## 2024-03-06 LAB
ALBUMIN SERPL-MCNC: 3.6 G/DL (ref 3.4–5)
ALBUMIN/GLOB SERPL: 0.9 {RATIO} (ref 1–2)
ALP LIVER SERPL-CCNC: 68 U/L
ALT SERPL-CCNC: 19 U/L
ANION GAP SERPL CALC-SCNC: 6 MMOL/L (ref 0–18)
AST SERPL-CCNC: 17 U/L (ref 15–37)
BILIRUB SERPL-MCNC: 0.4 MG/DL (ref 0.1–2)
BUN BLD-MCNC: 9 MG/DL (ref 9–23)
CALCIUM BLD-MCNC: 9.4 MG/DL (ref 8.5–10.1)
CHLORIDE SERPL-SCNC: 105 MMOL/L (ref 98–112)
CO2 SERPL-SCNC: 25 MMOL/L (ref 21–32)
CREAT BLD-MCNC: 0.77 MG/DL
EGFRCR SERPLBLD CKD-EPI 2021: 106 ML/MIN/1.73M2 (ref 60–?)
FASTING STATUS PATIENT QL REPORTED: YES
GLOBULIN PLAS-MCNC: 4 G/DL (ref 2.8–4.4)
GLUCOSE BLD-MCNC: 82 MG/DL (ref 70–99)
OSMOLALITY SERPL CALC.SUM OF ELEC: 280 MOSM/KG (ref 275–295)
POTASSIUM SERPL-SCNC: 4 MMOL/L (ref 3.5–5.1)
PROT SERPL-MCNC: 7.6 G/DL (ref 6.4–8.2)
SODIUM SERPL-SCNC: 136 MMOL/L (ref 136–145)

## 2024-06-20 ENCOUNTER — OFFICE VISIT (OUTPATIENT)
Facility: CLINIC | Age: 29
End: 2024-06-20

## 2024-06-20 VITALS
BODY MASS INDEX: 36.7 KG/M2 | HEIGHT: 66 IN | SYSTOLIC BLOOD PRESSURE: 126 MMHG | HEART RATE: 104 BPM | WEIGHT: 228.38 LBS | DIASTOLIC BLOOD PRESSURE: 72 MMHG

## 2024-06-20 DIAGNOSIS — G43.829 MENSTRUAL MIGRAINE WITHOUT STATUS MIGRAINOSUS, NOT INTRACTABLE: ICD-10-CM

## 2024-06-20 DIAGNOSIS — Z01.419 ENCOUNTER FOR ANNUAL ROUTINE GYNECOLOGICAL EXAMINATION: Primary | ICD-10-CM

## 2024-06-20 PROCEDURE — 3074F SYST BP LT 130 MM HG: CPT | Performed by: STUDENT IN AN ORGANIZED HEALTH CARE EDUCATION/TRAINING PROGRAM

## 2024-06-20 PROCEDURE — 3008F BODY MASS INDEX DOCD: CPT | Performed by: STUDENT IN AN ORGANIZED HEALTH CARE EDUCATION/TRAINING PROGRAM

## 2024-06-20 PROCEDURE — 99395 PREV VISIT EST AGE 18-39: CPT | Performed by: STUDENT IN AN ORGANIZED HEALTH CARE EDUCATION/TRAINING PROGRAM

## 2024-06-20 PROCEDURE — 3078F DIAST BP <80 MM HG: CPT | Performed by: STUDENT IN AN ORGANIZED HEALTH CARE EDUCATION/TRAINING PROGRAM

## 2024-06-20 NOTE — PROGRESS NOTES
[facility]  Obstetrics and Gynecology   History & Physical    Chief complaint:   Chief Complaint   Patient presents with    Wellness Visit     Annual Exam     IUD     Patient currently has the Kyleena, reports 2023-2024 she was getting headaches for one week with her periods, would like to discuss other options         HPI: Zak Malave is a 29 year old  with Patient's last menstrual period was 2024 (approximate).      Pt here for annual GYN exam  Kyleena IUD inserted 2021. On exam last year no strings visible, pt did have an US 2023 that confirmed IUD was in place  Pt is concerned because from around Sept until Feb each month would have migraines around time of menses  Her Pcp suggested it may be related to IUD  Pt having regular menses, monthly.    Denies hx abnormal paps  Last pap 2023 NILM HPV neg    Pt had had irregular menses in the past (not recently), she'd had PCOS labs with PCP which showed normal testosterone, insulin resistance (A1c 5.8)    PMHx/Surghx reviewed  Famhx: denies family hx breast/ovarian/colon/uterine cancers    PCP: Charly Schmitz MD    Review of Systems:  Constitutional:  Denies fatigue, night sweats, hot flashes  Eyes:  denies blurred or double vision  Cardiovascular:  denies chest pain or palpitations  Respiratory:  denies shortness of breath  Gastrointestinal:  denies heartburn, abdominal pain, diarrhea or constipation  Genitourinary:  denies dysuria, incontinence, abnormal vaginal discharg  Musculoskeletal:  denies back pain.  Skin/Breast:  Denies any breast pain, lumps, or discharge.   Neurological:  denies headaches, extremity weakness or numbness.  Psychiatric: denies depression or anxiety.  Endocrine:   denies excessive thirst or urination.  Heme/Lymph:  denies history of anemia, easy bruising or bleeding.    OB History:  OB History    Para Term  AB Living   0 0 0 0 0 0   SAB IAB Ectopic Multiple Live Births   0 0 0 0 0        Meds:  Current Outpatient Medications on File Prior to Visit   Medication Sig Dispense Refill    Levonorgestrel (KYLEENA IU) by Intrauterine route.      fluconazole 150 MG Oral Tab Take one tab day 1 if still symptomatic take second tab in 72 hours (Patient not taking: Reported on 2/3/2024) 2 tablet 0    nystatin 100,000 Units/g External Ointment Apply 1 Application topically 2 (two) times daily. (Patient not taking: Reported on 2/3/2024) 30 g 0     No current facility-administered medications on file prior to visit.       All:  Allergies   Allergen Reactions    Seasonal OTHER (SEE COMMENTS)     sneezing       PMH:  Past Medical History:    Fracture of left leg    at age 7-8       PSH:  Past Surgical History:   Procedure Laterality Date    Appendectomy      done as child around age 5 or 6    [place] teeth removed         Social History:  Social History     Socioeconomic History    Marital status: Single     Spouse name: Not on file    Number of children: Not on file    Years of education: Not on file    Highest education level: Not on file   Occupational History    Not on file   Tobacco Use    Smoking status: Never     Passive exposure: Never    Smokeless tobacco: Never   Vaping Use    Vaping status: Never Used   Substance and Sexual Activity    Alcohol use: Yes     Comment: social    Drug use: Never    Sexual activity: Yes     Partners: Male   Other Topics Concern    Not on file   Social History Narrative    Not on file     Social Determinants of Health     Financial Resource Strain: Not on file   Food Insecurity: Not on file   Transportation Needs: Not on file   Physical Activity: Not on file   Stress: Not on file   Social Connections: Not on file   Housing Stability: At Risk (8/21/2023)    Received from [facility]     Living Situation: Not on file     Housing Problems: Not on file        Family History:  Family History   Problem Relation Age of Onset    Heart Disease Father     Glaucoma  Father     Depression Mother     Other (Rheumatoid arthritis) Maternal Grandmother     Prostate Cancer Maternal Grandfather         dx age 60s    Glaucoma Paternal Grandmother     Diabetes Paternal Grandfather     Breast Cancer Neg     Ovarian Cancer Neg     Uterine Cancer Neg     Pancreatic Cancer Neg     Colon Cancer Neg        PHYSICAL EXAM:     Vitals:    24 1336   BP: 126/72   Pulse: 104   Weight: 228 lb 6.4 oz (103.6 kg)   Height: 66\"       Body mass index is 36.86 kg/m².      Constitutional: well developed, well nourished  Head/Face: normocephalic  Neck/Thyroid: thyroid symmetric, no thyromegaly, no nodules, no adenopathy  Lymphatic: no abnormal supraclavicular or axillary adenopathy is noted  Breast: normal without palpable masses, tenderness, asymmetry, nipple discharge, nipple retraction or skin changes  Abdomen:  soft, nontender, nondistended, no masses  Skin/Hair: no unusual rashes or bruises  Extremities: no edema, no cyanosis  Psychiatric:  Oriented to time, place, person and situation. Appropriate mood and affect    Pelvic Exam:  External Genitalia: normal appearance, hair distribution, and no lesions  Urethral Meatus:  normal in size, location, without lesions and prolapse  Bladder:  No fullness, masses or tenderness  Vagina:  Normal appearance without lesions, no abnormal discharge  Cervix:  Normal without tenderness on motion. +IUD strings (very short - teased out ext os with cytobrush)  Uterus: normal in size, contour, position, mobility, without tenderness  Adnexa: normal without masses or tenderness  Perineum: normal  Anus: no hemorrhoids     Assessment & Plan:     Zak Malave is a 29 year old      Diagnoses and all orders for this visit:    Encounter for annual routine gynecological examination    Menstrual migraine without status migrainosus, not intractable       Normal breast and pelvic exam  IUD strings visible! D/w pt if she does decide to have IUD removed we could  just do it in clinic  She is considering - we discussed that headache is not a very common side effect of IUD, but that the IUD will not suppress ovulation so her hormones will still cycle and that is a big trigger for menstrual migraines. Discussed option of OCP to suppress menstrual migraines. Pt thinking will stick with IUD for now      Healthcare maintenance:  Pap: 4/2023 NILM HPV neg  HPV vaccine: received  Contraception: kyleena since 12/2021  Mammogram, age 38        Alana Mejia MD  [facility] - OBGYN

## 2024-08-25 ENCOUNTER — HOSPITAL ENCOUNTER (OUTPATIENT)
Age: 29
Discharge: HOME | End: 2024-08-25
Payer: COMMERCIAL

## 2024-08-25 VITALS
BODY MASS INDEX: 36.1 KG/M2 | HEART RATE: 86 BPM | OXYGEN SATURATION: 98 % | SYSTOLIC BLOOD PRESSURE: 105 MMHG | WEIGHT: 230 LBS | TEMPERATURE: 99 F | DIASTOLIC BLOOD PRESSURE: 67 MMHG | HEIGHT: 67 IN | RESPIRATION RATE: 20 BRPM

## 2024-08-25 DIAGNOSIS — N76.0 ACUTE VAGINITIS: Primary | ICD-10-CM

## 2024-08-25 DIAGNOSIS — R21 RASH AND NONSPECIFIC SKIN ERUPTION: ICD-10-CM

## 2024-08-25 PROCEDURE — 99213 OFFICE O/P EST LOW 20 MIN: CPT

## 2024-08-25 RX ORDER — FLUCONAZOLE 150 MG/1
150 TABLET ORAL ONCE
Qty: 1 TABLET | Refills: 0 | Status: SHIPPED | OUTPATIENT
Start: 2024-08-25 | End: 2024-08-25

## 2024-08-25 NOTE — DISCHARGE INSTRUCTIONS
Please return to the ER/clinic if symptoms worsen. Follow-up with your PCP in 24-48 hours as needed.    Wear loose cotton shirt IE:  nothing to tight on the shoulder region.  You may apply some hydrocortisone ointment or cream to the abdominal rash.  Avoid picking or scratching at the area.  Also recommend taking an antihistamine daily i.e. Zyrtec.  Take the Diflucan as prescribed.  If If symptoms persist or worsen make a follow-up appointment with your PCP and/or dermatologist for further evaluation and treatment.

## 2024-08-25 NOTE — ED PROVIDER NOTES
Patient Seen in: [facility]      History     Chief Complaint   Patient presents with    Laceration/Abrasion     Unusual broken blood vessel looking type \"rash\" on skin near shoulders and stomach with a curved line pattern on the shoulders and dime sized spot on stomach. - Entered by patient    Rash Skin Problem     Stated Complaint: Laceration/Abrasion - Unusual broken blood vessel looking type \"rash\" on skin n*    Subjective:   HPI    29-year-old female here with multiple concerns:  Patient reports a bilateral rash that is similar looking on both her shoulders that she noticed today.  Patient denies any itching or irritation to the area.  Patient also has a similar type rash to her abdomen.  Patient denies any lotion detergent or product.  Patient denies lip swelling throat itching wheezing etc.  Patient denies chest pain, shortness of breath, cough, abdominal pain, nausea, vomiting or diarrhea.  Patient was concerned because she was visiting a family member in the ICU wanted make sure there is nothing transmittable.  Patient also complains of some vaginitis or vaginal itching.  Patient denies discharge dysuria hematuria flank pain.  Patient is tolerating p.o. speaking full sentences.  Afebrile.    Objective:   Past Medical History:    Fracture of left leg    at age 7-8              Past Surgical History:   Procedure Laterality Date    Appendectomy      done as child around age 5 or 6    [place] teeth removed                The patient's medication list, past medical history and social history elements  as listed in today's nurse's notes are reviewed and agree.   The patient's family history is reviewed and is noncontributory to the presenting problem, except as indicated as above.     Social History     Socioeconomic History    Marital status: Single   Tobacco Use    Smoking status: Never     Passive exposure: Never    Smokeless tobacco: Never   Vaping Use    Vaping status: Never Used   Substance  and Sexual Activity    Alcohol use: Yes     Comment: social    Drug use: Never    Sexual activity: Yes     Partners: Male     Social Determinants of Health      [facility]              Review of Systems    Positive for stated Chief Complaint: Laceration/Abrasion (Unusual broken blood vessel looking type \"rash\" on skin near shoulders and stomach with a curved line pattern on the shoulders and dime sized spot on stomach. - Entered by patient) and Rash Skin Problem    Other systems are as noted in HPI.  Constitutional and vital signs reviewed.      All other systems reviewed and negative except as noted above.    Physical Exam     ED Triage Vitals [08/25/24 1311]   /67   Pulse 86   Resp 20   Temp 99 °F (37.2 °C)   Temp src Temporal   SpO2 98 %   O2 Device None (Room air)       Current Vitals:   Vital Signs  BP: 105/67  Pulse: 86  Resp: 20  Temp: 99 °F (37.2 °C)  Temp src: Temporal    Oxygen Therapy  SpO2: 98 %  O2 Device: None (Room air)            Physical Exam  Vitals and nursing note reviewed.   Constitutional:       Appearance: Normal appearance. She is well-developed.   HENT:      Head: Normocephalic.      Right Ear: External ear normal.      Left Ear: External ear normal.      Nose: Nose normal.      Mouth/Throat:      Mouth: Mucous membranes are moist.   Eyes:      Conjunctiva/sclera: Conjunctivae normal.      Pupils: Pupils are equal, round, and reactive to light.   Cardiovascular:      Rate and Rhythm: Normal rate and regular rhythm.      Heart sounds: Normal heart sounds.   Pulmonary:      Effort: Pulmonary effort is normal.      Breath sounds: Normal breath sounds.   Musculoskeletal:      Cervical back: Normal range of motion and neck supple.   Skin:     General: Skin is warm.      Capillary Refill: Capillary refill takes less than 2 seconds.             Comments: petechial/bruising noted bilaterally on the shoulders    NOTE: The rash is following the  bra/shirt line    Abdomen: approx 2x2 cm erythematous macular/papular area  NOTE: appears different than above   Neurological:      General: No focal deficit present.      Mental Status: She is alert and oriented to person, place, and time.   Psychiatric:         Mood and Affect: Mood normal.         Behavior: Behavior normal.         Thought Content: Thought content normal.         Judgment: Judgment normal.             ED Course        NOTE: The rash seems to follow the shirt or bra line.  It appears to be slight bruising.  It is very minimal at this time.  However if symptoms persist or worsen recommend blood work etc.  At this point patient is advised to wear loose cotton sure nothing taking into the shoulders bilaterally.  She will use some topical hydrocortisone to the abdominal area etc. treat prophylactically for yeast infection.              MDM         Clinical Impression: rash/vaginitis  Course of Treatment:   Wear loose cotton shirt IE:  nothing to tight on the shoulder region.  You may apply some hydrocortisone ointment or cream to the abdominal rash.  Avoid picking or scratching at the area.  Also recommend taking an antihistamine daily i.e. Zyrtec.  Take the Diflucan as prescribed.  If If symptoms persist or worsen make a follow-up appointment with your PCP and/or dermatologist for further evaluation and treatment.      The patient is encouraged to return if any concerning symptoms arise. Additional verbal discharge instructions are given and discussed. Discharge medications are discussed. The patient is in good condition throughout the visit today and remains so upon discharge. I discuss the plan of care with the patient, who expresses understanding. All questions and concerns are addressed to the patient's satisfaction prior to discharge today.  Previous conversations with PCP and charts were reviewed.                                     Disposition and Plan     Clinical Impression:  1. Acute  vaginitis    2. Rash and nonspecific skin eruption         Disposition:  Discharge  8/25/2024  2:03 pm    Follow-up:  Charly Schmitz MD  [address]  [facility] [address]  [phone]          Akilah Parker MD  [address]  [facility] [address]  [phone]                Medications Prescribed:  Current Discharge Medication List        START taking these medications    Details   !! fluconazole (DIFLUCAN) 150 MG Oral Tab Take 1 tablet (150 mg total) by mouth once for 1 dose.  Qty: 1 tablet, Refills: 0       !! - Potential duplicate medications found. Please discuss with provider.

## 2024-08-25 NOTE — ED INITIAL ASSESSMENT (HPI)
Noted small red marks on shoulders that she noticed today. Also has small red spot on abdomen that has been there for a little while, does not itch or hurt. Also c/o vulva itching for 2-3 days

## 2024-08-29 ENCOUNTER — TELEPHONE (OUTPATIENT)
Dept: FAMILY MEDICINE CLINIC | Facility: CLINIC | Age: 29
End: 2024-08-29

## 2024-08-29 DIAGNOSIS — B37.9 YEAST INFECTION: Primary | ICD-10-CM

## 2024-08-29 NOTE — TELEPHONE ENCOUNTER
Patient went to ER and she had a yeast infection. The medication is all out and she was wondering if we could refill medication. The infection is much better but has not gone away completely.

## 2024-08-30 RX ORDER — FLUCONAZOLE 150 MG/1
TABLET ORAL
Qty: 2 TABLET | Refills: 0 | Status: SHIPPED | OUTPATIENT
Start: 2024-08-30

## 2024-08-30 NOTE — TELEPHONE ENCOUNTER
Notified pt of prescription sent to pharmacy  Advised to follow up with gyne if symptoms persist after this round of fluconazole  Pt verbalized understanding

## 2024-08-30 NOTE — TELEPHONE ENCOUNTER
Pt seen in  8/25 for yeast infection, was given one dose of fluconazole  Symptoms have reduced but not cleared up  Discharge is improving, discomfort improving but still there, still itching and irritation  Symptoms feel similar to last time she had yeast infection   Overall 70-80% better, wondering if she needs another dose fluconazole? Offered appt for this afternoon but pt working and unable to make appt   Please advise, thank you!

## 2024-08-30 NOTE — TELEPHONE ENCOUNTER
Please notify patient: Fluconazole sent to her pharmacy on file for another round. If not improving after this additional round of fluconazole (1 tablet at first, then a second tablet if still having symptoms after 72 hours), then recommend patient f/u with her gyne.     Charly Schmitz MD, 08/30/24, 10:11 AM

## 2024-09-14 ENCOUNTER — OFFICE VISIT (OUTPATIENT)
Dept: FAMILY MEDICINE CLINIC | Facility: CLINIC | Age: 29
End: 2024-09-14
Payer: COMMERCIAL

## 2024-09-14 VITALS
OXYGEN SATURATION: 98 % | BODY MASS INDEX: 36 KG/M2 | HEART RATE: 109 BPM | TEMPERATURE: 99 F | SYSTOLIC BLOOD PRESSURE: 110 MMHG | RESPIRATION RATE: 18 BRPM | WEIGHT: 227 LBS | DIASTOLIC BLOOD PRESSURE: 70 MMHG

## 2024-09-14 DIAGNOSIS — J01.90 ACUTE SINUSITIS, RECURRENCE NOT SPECIFIED, UNSPECIFIED LOCATION: Primary | ICD-10-CM

## 2024-09-14 DIAGNOSIS — B34.9 VIRAL INFECTION: ICD-10-CM

## 2024-09-14 PROCEDURE — 3078F DIAST BP <80 MM HG: CPT | Performed by: NURSE PRACTITIONER

## 2024-09-14 PROCEDURE — 99213 OFFICE O/P EST LOW 20 MIN: CPT | Performed by: NURSE PRACTITIONER

## 2024-09-14 PROCEDURE — 3074F SYST BP LT 130 MM HG: CPT | Performed by: NURSE PRACTITIONER

## 2024-09-14 NOTE — PATIENT INSTRUCTIONS
Tylenol/Ibuprofen for fever/pain.  Encourage to take Flonase daily to help with fluid in ears and postnasal drip.  May use saline or nedi-pot for nasal irrigation with distilled water.  Increase oral intake of fluids.  Start taking an OTC decongestant.      When to take antibiotics:  >10 days without improvement  Onset of severe symptoms with high fever >102 and purulent drainage/facial pain lasting 3-4 consecutive days since onset  Worsening symptoms/signs for 3-4 days characterized by new onset fever, HA, or increased nasal discharge following a typical viral upper respiratory infection that lasted five to six days and were initially improving (\"double sickening\").  Encouraged to follow up with ENT

## 2024-09-14 NOTE — PROGRESS NOTES
CHIEF COMPLAINT:     Chief Complaint   Patient presents with    Sinus Problem     3 Neg Covid tests. Sinus pressure, watery eyes, runny nose, sneezing, headache. Scratchy throat morning of 9/10. - Entered by patient  S/s for 4 days, symptoms are getting worst.         HPI:   Zak Malave is a 29 year old female who presents with c/o nasal congestion w/sinus pressure, sneezing, HA and sore throat. Patient reports on Monday she developed random sneezing by Tuesday her symptom increased to watery eye. Today she has increased sinus pressure with scratchy throat.  OTC: Allergy medicine and Aleve-D  COVID: negative x 3 at home    Denies: seasonal allergies, fever, coughing, sob, wheezing, sore throat, inability to swallow, drooling, ear pain/pressure, n/v/d, change in behavior/appetite/sleep or known exposure to illness      Current Outpatient Medications   Medication Sig Dispense Refill    fluconazole 150 MG Oral Tab Take one tablet. If still symptomatic take second tablet after 72 hours 2 tablet 0    Levonorgestrel (KYLEENA IU) by Intrauterine route.        Past Medical History:    Fracture of left leg    at age 7-8      Past Surgical History:   Procedure Laterality Date    Appendectomy      done as child around age 5 or 6    [place] teeth removed           Social History     Socioeconomic History    Marital status: Single   Tobacco Use    Smoking status: Never     Passive exposure: Never    Smokeless tobacco: Never   Vaping Use    Vaping status: Never Used   Substance and Sexual Activity    Alcohol use: Yes     Comment: social    Drug use: Never    Sexual activity: Yes     Partners: Male     Social Determinants of Health      [facility]         REVIEW OF SYSTEMS:   GENERAL: Unchanged appetite  SKIN: no rashes or abnormal skin lesions  HEENT: See HPI  LUNGS: denies shortness of breath or wheezing, See HPI  CARDIOVASCULAR: denies chest pain or palpitations   GI: denies  N/V/C or abdominal pain  NEURO: Denies dizziness or weakness    EXAM:   /70   Pulse 109   Resp 18   Wt 227 lb (103 kg)   LMP 09/06/2024 (Exact Date)   SpO2 98%   BMI 35.55 kg/m²   GENERAL: well developed, well nourished,in no apparent distress  SKIN: no rashes,no suspicious lesions  HEAD: atraumatic, normocephalic.  + tenderness on palpation of maxillary/frontal sinuses  EYES: conjunctiva clear, EOM intact  EARS: TM's pearly/gray, no bulging, no retraction, mild fluid, bony landmarks present  NOSE: Nostrils patent, clear nasal discharge, nasal mucosa pink.   THROAT: Oral mucosa pink, moist. Posterior pharynx is pink. No exudates. Tonsils 1/4.    NECK: Supple, non-tender  LUNGS: clear to auscultation bilaterally, no wheezes or rhonchi. Breathing is non labored.  CARDIO: RRR without murmur  EXTREMITIES: no cyanosis, clubbing or edema  LYMPH:  No head or neck lymphadenopathy.        ASSESSMENT AND PLAN:   Zak Malave is a 29 year old female who presents with upper respiratory symptoms that are consistent with    ASSESSMENT:   Encounter Diagnoses   Name Primary?    Acute sinusitis, recurrence not specified, unspecified location Yes    Viral infection          PLAN:     Comfort care per pt instructions.   To follow up for any new or worsening symptoms.   To go to the ER for any severe symptoms such as difficulty breathing or chest pain.     Meds & Refills for this Visit:  Requested Prescriptions      No prescriptions requested or ordered in this encounter       Risks, benefits, and side effects of medication explained and discussed.    Patient Instructions   Tylenol/Ibuprofen for fever/pain.  Encourage to take Flonase daily to help with fluid in ears and postnasal drip.  May use saline or nedi-pot for nasal irrigation with distilled water.  Increase oral intake of fluids.  Start taking an OTC decongestant.      When to take antibiotics:  >10 days without improvement  Onset of severe symptoms with high  fever >102 and purulent drainage/facial pain lasting 3-4 consecutive days since onset  Worsening symptoms/signs for 3-4 days characterized by new onset fever, HA, or increased nasal discharge following a typical viral upper respiratory infection that lasted five to six days and were initially improving (\"double sickening\").  Encouraged to follow up with ENT    The patient indicates understanding of these issues and agrees to the plan.  The patient is asked to return if sx's persist or worsen.

## 2024-10-03 ENCOUNTER — TELEPHONE (OUTPATIENT)
Dept: ADMINISTRATIVE | Age: 29
End: 2024-10-03

## 2024-10-03 DIAGNOSIS — B37.31 YEAST INFECTION OF THE VAGINA: Primary | ICD-10-CM

## 2024-10-03 DIAGNOSIS — Z12.4 CERVICAL CANCER SCREENING: ICD-10-CM

## 2024-10-06 ENCOUNTER — OFFICE VISIT (OUTPATIENT)
Dept: FAMILY MEDICINE CLINIC | Facility: CLINIC | Age: 29
End: 2024-10-06
Payer: COMMERCIAL

## 2024-10-06 VITALS
OXYGEN SATURATION: 97 % | BODY MASS INDEX: 36.48 KG/M2 | TEMPERATURE: 97 F | SYSTOLIC BLOOD PRESSURE: 121 MMHG | DIASTOLIC BLOOD PRESSURE: 81 MMHG | HEART RATE: 91 BPM | HEIGHT: 66 IN | RESPIRATION RATE: 18 BRPM | WEIGHT: 227 LBS

## 2024-10-06 DIAGNOSIS — N89.8 VAGINA ITCHING: Primary | ICD-10-CM

## 2024-10-06 PROCEDURE — 81514 NFCT DS BV&VAGINITIS DNA ALG: CPT | Performed by: PHYSICIAN ASSISTANT

## 2024-10-06 RX ORDER — FLUCONAZOLE 150 MG/1
TABLET ORAL
Qty: 2 TABLET | Refills: 0 | Status: SHIPPED | OUTPATIENT
Start: 2024-10-06

## 2024-10-06 NOTE — PATIENT INSTRUCTIONS
Diflucan   Will call or MyChart with test results   Please follow up with PCP if no improvement or if symptoms worsen

## 2024-10-06 NOTE — PROGRESS NOTES
HPI:   Zak Malave is a 29 year old female who presents for vaginal symptoms for 6 days. Vaginal itching and discharge. Feels like yeast she has had in the past. Mild vaginal redness. She has used monistat and helped but did not completely resolve symptoms. No urinary issues. No new sexual partners, concern for STI. No abdominal pain, back pain, fever, vomiting.         Current Outpatient Medications   Medication Sig Dispense Refill    fluconazole (DIFLUCAN) 150 MG Oral Tab Take one tab day one and repeat in 72 hours 2 tablet 0    Levonorgestrel (KYLEENA IU) by Intrauterine route.      fluconazole 150 MG Oral Tab Take one tablet. If still symptomatic take second tablet after 72 hours (Patient not taking: Reported on 10/6/2024) 2 tablet 0      Past Medical History:    Fracture of left leg    at age 7-8      Past Surgical History:   Procedure Laterality Date    Appendectomy      done as child around age 5 or 6    [place] teeth removed        Family History   Problem Relation Age of Onset    Heart Disease Father     Glaucoma Father     Depression Mother     Other (Rheumatoid arthritis) Maternal Grandmother     Prostate Cancer Maternal Grandfather         dx age 60s    Glaucoma Paternal Grandmother     Diabetes Paternal Grandfather     Breast Cancer Neg     Ovarian Cancer Neg     Uterine Cancer Neg     Pancreatic Cancer Neg     Colon Cancer Neg       Social History:   Social History     Socioeconomic History    Marital status: Single   Tobacco Use    Smoking status: Never     Passive exposure: Never    Smokeless tobacco: Never   Vaping Use    Vaping status: Never Used   Substance and Sexual Activity    Alcohol use: Yes     Comment: social    Drug use: Never    Sexual activity: Yes     Partners: Male     Social Determinants of Health      [facility]        REVIEW OF SYSTEMS:   GENERAL: no fatigue, fever, chills.  SKIN: denies any unusual skin lesions  GI: denies  abdominal pain,denies heartburn  : as above  MUSCULOSKELETAL: denies back pain    EXAM:   /81   Pulse 91   Temp 97.1 °F (36.2 °C)   Resp 18   Ht 5' 6\" (1.676 m)   Wt 227 lb (103 kg)   LMP 09/06/2024 (Exact Date)   SpO2 97%   BMI 36.64 kg/m²   Body mass index is 36.64 kg/m².   Physical Exam  Constitutional:       General: She is not in acute distress.     Appearance: Normal appearance.   HENT:      Head: Normocephalic and atraumatic.   Cardiovascular:      Rate and Rhythm: Normal rate and regular rhythm.      Heart sounds: Normal heart sounds. No murmur heard.  Pulmonary:      Effort: Pulmonary effort is normal.      Breath sounds: Normal breath sounds. No wheezing or rhonchi.   Abdominal:      General: Abdomen is flat. Bowel sounds are normal. There is no distension.      Palpations: Abdomen is soft. There is no mass.      Tenderness: There is no abdominal tenderness. There is no right CVA tenderness, left CVA tenderness or guarding.   Genitourinary:     Labia:         Right: No rash, tenderness or lesion.         Left: No rash, tenderness or lesion.       Vagina: No foreign body. Vaginal discharge (scant white discharge) and erythema present. No tenderness, bleeding or lesions.   Neurological:      Mental Status: She is alert.           ASSESSMENT AND PLAN:     Zak Malave is a 29 year old female presents with UTI symptoms.    ASSESSMENT:  Encounter Diagnosis   Name Primary?    Vagina itching Yes     Suspect yeast based on exam   Start diflucan   Vaginitis swab done     PLAN: Meds as listed below.  Comfort measures as described in Patient Instructions.     Meds & Refills for this Visit:  Requested Prescriptions     Signed Prescriptions Disp Refills    fluconazole (DIFLUCAN) 150 MG Oral Tab 2 tablet 0     Sig: Take one tab day one and repeat in 72 hours       Risk and benefits of medication discussed.       The patient indicates understanding of these issues and agrees to the plan.  The  patient is asked to see PCP in 3 days if not better.    Patient Instructions   Diflucan   Will call or MyChart with test results   Please follow up with PCP if no improvement or if symptoms worsen         Discussed prevention of yeast/bv:   Wear cotton underwear   Change underwear after exercise to keep area as dry as possible.  Eat yogurt daily to improve balance of natural caridad.  Follow up if symptoms are no better in 2-3 days, sooner if symptoms worsen.

## 2024-10-07 LAB
BV BACTERIA DNA VAG QL NAA+PROBE: NEGATIVE
C GLABRATA DNA VAG QL NAA+PROBE: NEGATIVE
C KRUSEI DNA VAG QL NAA+PROBE: NEGATIVE
CANDIDA DNA VAG QL NAA+PROBE: NEGATIVE
T VAGINALIS DNA VAG QL NAA+PROBE: NEGATIVE

## 2024-10-16 ENCOUNTER — OFFICE VISIT (OUTPATIENT)
Facility: CLINIC | Age: 29
End: 2024-10-16
Payer: COMMERCIAL

## 2024-10-16 VITALS
DIASTOLIC BLOOD PRESSURE: 82 MMHG | SYSTOLIC BLOOD PRESSURE: 118 MMHG | WEIGHT: 234 LBS | HEIGHT: 66 IN | HEART RATE: 89 BPM | BODY MASS INDEX: 37.61 KG/M2

## 2024-10-16 DIAGNOSIS — Z23 NEED FOR VACCINATION: ICD-10-CM

## 2024-10-16 DIAGNOSIS — N90.7 LABIAL CYST: Primary | ICD-10-CM

## 2024-10-16 PROCEDURE — 90471 IMMUNIZATION ADMIN: CPT | Performed by: OBSTETRICS & GYNECOLOGY

## 2024-10-16 PROCEDURE — 99212 OFFICE O/P EST SF 10 MIN: CPT

## 2024-10-16 PROCEDURE — 3079F DIAST BP 80-89 MM HG: CPT | Performed by: OBSTETRICS & GYNECOLOGY

## 2024-10-16 PROCEDURE — 90656 IIV3 VACC NO PRSV 0.5 ML IM: CPT | Performed by: OBSTETRICS & GYNECOLOGY

## 2024-10-16 PROCEDURE — 3074F SYST BP LT 130 MM HG: CPT | Performed by: OBSTETRICS & GYNECOLOGY

## 2024-10-16 PROCEDURE — 3008F BODY MASS INDEX DOCD: CPT | Performed by: OBSTETRICS & GYNECOLOGY

## 2024-10-16 NOTE — PROGRESS NOTES
Zak Malave is a 29 year old female  Patient's last menstrual period was 10/09/2024 (within days).   Chief Complaint   Patient presents with    Gyn Problem     Possible yeast infection and/or lumps near labia, recently seen at the walk-in clinic and was treated for yeast infections. The yeast infection is cleared up but she's concerned about the lumps     Other     Patient said YES to student in the room    .  Was treated at walk in clinic for yeast infection. Symptoms has resolved. She had a small bump on her left and right labia.     OBSTETRICS HISTORY:  OB History    Para Term  AB Living   0 0 0 0 0 0   SAB IAB Ectopic Multiple Live Births   0 0 0 0 0       GYNE HISTORY:     History   Sexual Activity    Sexual activity: Yes    Partners: Male                 MEDICAL HISTORY:  Past Medical History:    Fracture of left leg    at age 7-8       SURGICAL HISTORY:  Past Surgical History:   Procedure Laterality Date    Appendectomy      done as child around age 5 or 6    [place] teeth removed         SOCIAL HISTORY:  Social History     Socioeconomic History    Marital status: Single     Spouse name: Not on file    Number of children: Not on file    Years of education: Not on file    Highest education level: Not on file   Occupational History    Not on file   Tobacco Use    Smoking status: Never     Passive exposure: Never    Smokeless tobacco: Never   Vaping Use    Vaping status: Never Used   Substance and Sexual Activity    Alcohol use: Yes     Comment: social    Drug use: Never    Sexual activity: Yes     Partners: Male   Other Topics Concern    Not on file   Social History Narrative    Not on file     Social Drivers of Health     Financial Resource Strain: Not on file   Food Insecurity: Not on file   Transportation Needs: Not on file   Physical Activity: Not on file   Stress: Not on file   Social Connections: Not on file   Housing Stability: At Risk (2023)    Received from  [facility], [facility]     Living Situation: Not on file     Housing Problems: Not on file       FAMILY HISTORY:  Family History   Problem Relation Age of Onset    Heart Disease Father     Glaucoma Father     Depression Mother     Other (Rheumatoid arthritis) Maternal Grandmother     Prostate Cancer Maternal Grandfather         dx age 60s    Glaucoma Paternal Grandmother     Diabetes Paternal Grandfather     Breast Cancer Neg     Ovarian Cancer Neg     Uterine Cancer Neg     Pancreatic Cancer Neg     Colon Cancer Neg        MEDICATIONS:    Current Outpatient Medications:     Levonorgestrel (KYLEENA IU), by Intrauterine route., Disp: , Rfl:     fluconazole (DIFLUCAN) 150 MG Oral Tab, Take one tab day one and repeat in 72 hours (Patient not taking: Reported on 10/16/2024), Disp: 2 tablet, Rfl: 0    fluconazole 150 MG Oral Tab, Take one tablet. If still symptomatic take second tablet after 72 hours (Patient not taking: Reported on 10/16/2024), Disp: 2 tablet, Rfl: 0    ALLERGIES:  Allergies[1]      PHYSICAL EXAM:   Pelvic Exam:  External Genitalia: normal appearance, hair distribution, and no lesions. Small cyst, size top of pin head,  on her left labia.   Urethral Meatus:  normal in size, location, without lesions and prolapse  Bladder:  No fullness, masses or tenderness  Vagina:  Normal appearance without lesions, no abnormal discharge  Cervix:  Normal without tenderness on motion  Perineum: normal  Anus: no hemorroids     Assessment & Plan:  1. Labial cyst  -can do sitz baths to help it reabsorb  -advise not to pop or pick it, can become infected.   -cyst so small - non concerning                 [1]   Allergies  Allergen Reactions    Seasonal OTHER (SEE COMMENTS)     sneezing

## 2025-01-07 ENCOUNTER — APPOINTMENT (OUTPATIENT)
Dept: GENERAL RADIOLOGY | Age: 30
End: 2025-01-07
Attending: PHYSICIAN ASSISTANT
Payer: COMMERCIAL

## 2025-01-07 ENCOUNTER — HOSPITAL ENCOUNTER (OUTPATIENT)
Age: 30
Discharge: HOME | End: 2025-01-07
Payer: COMMERCIAL

## 2025-01-07 VITALS
OXYGEN SATURATION: 98 % | SYSTOLIC BLOOD PRESSURE: 142 MMHG | RESPIRATION RATE: 18 BRPM | HEART RATE: 92 BPM | TEMPERATURE: 98 F | DIASTOLIC BLOOD PRESSURE: 88 MMHG

## 2025-01-07 DIAGNOSIS — S70.02XA CONTUSION OF LEFT HIP, INITIAL ENCOUNTER: Primary | ICD-10-CM

## 2025-01-07 DIAGNOSIS — S89.92XA KNEE INJURY, LEFT, INITIAL ENCOUNTER: ICD-10-CM

## 2025-01-07 PROCEDURE — 73502 X-RAY EXAM HIP UNI 2-3 VIEWS: CPT | Performed by: PHYSICIAN ASSISTANT

## 2025-01-07 PROCEDURE — 99214 OFFICE O/P EST MOD 30 MIN: CPT

## 2025-01-07 PROCEDURE — 99213 OFFICE O/P EST LOW 20 MIN: CPT

## 2025-01-07 PROCEDURE — 73560 X-RAY EXAM OF KNEE 1 OR 2: CPT | Performed by: PHYSICIAN ASSISTANT

## 2025-01-07 RX ORDER — NAPROXEN 500 MG/1
500 TABLET ORAL 2 TIMES DAILY PRN
Qty: 20 TABLET | Refills: 0 | Status: SHIPPED | OUTPATIENT
Start: 2025-01-07

## 2025-01-07 RX ORDER — DEXAMETHASONE 4 MG/1
4 TABLET ORAL ONCE
Status: COMPLETED | OUTPATIENT
Start: 2025-01-07 | End: 2025-01-07

## 2025-01-07 RX ORDER — IBUPROFEN 600 MG/1
600 TABLET, FILM COATED ORAL ONCE
Status: COMPLETED | OUTPATIENT
Start: 2025-01-07 | End: 2025-01-07

## 2025-01-07 RX ORDER — CYCLOBENZAPRINE HCL 10 MG
10 TABLET ORAL NIGHTLY
Qty: 20 TABLET | Refills: 0 | Status: SHIPPED | OUTPATIENT
Start: 2025-01-07

## 2025-01-07 RX ORDER — METHYLPREDNISOLONE 4 MG/1
TABLET ORAL
Qty: 1 EACH | Refills: 0 | Status: SHIPPED | OUTPATIENT
Start: 2025-01-07

## 2025-01-07 NOTE — ED INITIAL ASSESSMENT (HPI)
Presents for left leg pain after slipping on ice today. Pain radiates from hip down through knee.

## 2025-01-07 NOTE — ED PROVIDER NOTES
Patient Seen in: [facility]      History     Chief Complaint   Patient presents with    Leg or Foot Injury     Stated Complaint: fall/left leg inj    Subjective:   HPI      29-year-old female here with complaint of having slipped on the ice yesterday.  Patient reports that she came down on her hip region twisting her left knee and she heard a pop.  Patient able to ambulate.  Patient denies muscle weakness or neurodeficits.  Patient denies chest pain, shortness of breath, cough, abdominal pain, nausea, vomiting or diarrhea.  Patient denies chest pain, shortness of breath, cough, abdominal pain, nausea, vomiting or diarrhea.  Afebrile.    Objective:     Past Medical History:    Fracture of left leg    at age 7-8            The patient's medication list, past medical history and social history elements  as listed in today's nurse's notes are reviewed and agree.   The patient's family history is reviewed and is noncontributory to the presenting problem, except as indicated as above.     Past Surgical History:   Procedure Laterality Date    Appendectomy      done as child around age 5 or 6    [place] teeth removed                  Social History     Socioeconomic History    Marital status: Single   Tobacco Use    Smoking status: Never     Passive exposure: Never    Smokeless tobacco: Never   Vaping Use    Vaping status: Never Used   Substance and Sexual Activity    Alcohol use: Yes     Comment: social    Drug use: Never    Sexual activity: Yes     Partners: Male     Social Drivers of Health      [facility]              Review of Systems    Positive for stated complaint: fall/left leg inj  Other systems are as noted in HPI.  Constitutional and vital signs reviewed.      All other systems reviewed and negative except as noted above.    Physical Exam     ED Triage Vitals [01/07/25 1439]   /88   Pulse 92   Resp 18   Temp 98 °F (36.7 °C)   Temp src Oral    SpO2 98 %   O2 Device        Current Vitals:   Vital Signs  BP: 142/88  Pulse: 92  Resp: 18  Temp: 98 °F (36.7 °C)  Temp src: Oral    Oxygen Therapy  SpO2: 98 %        Physical Exam  Vitals and nursing note reviewed.   Constitutional:       Appearance: Normal appearance. She is well-developed.   HENT:      Head: Normocephalic.      Right Ear: External ear normal.      Left Ear: External ear normal.      Nose: Nose normal.      Mouth/Throat:      Mouth: Mucous membranes are moist.   Eyes:      Conjunctiva/sclera: Conjunctivae normal.      Pupils: Pupils are equal, round, and reactive to light.   Cardiovascular:      Rate and Rhythm: Normal rate and regular rhythm.      Heart sounds: Normal heart sounds.   Pulmonary:      Effort: Pulmonary effort is normal.      Breath sounds: Normal breath sounds.   Musculoskeletal:      Cervical back: Normal range of motion and neck supple.      Right hip: Normal.      Left hip: Tenderness and bony tenderness present.      Right upper leg: Normal.      Left upper leg: Normal.      Right lower leg: Normal.      Left lower leg: Tenderness and bony tenderness present.      Right ankle: Normal.      Left ankle: Normal.      Comments: RLE/LLE: FROM< N/V intact, strength 5/5   Skin:     General: Skin is warm.      Capillary Refill: Capillary refill takes less than 2 seconds.   Neurological:      General: No focal deficit present.      Mental Status: She is alert and oriented to person, place, and time.   Psychiatric:         Mood and Affect: Mood normal.         Behavior: Behavior normal.         Thought Content: Thought content normal.         Judgment: Judgment normal.           ED Course   I personally reviewed the xray images and and saw these findings: no acute fractures  XR KNEE (1 OR 2 VIEWS), LEFT (CPT=73560)    Result Date: 1/7/2025  PROCEDURE:  XR KNEE (1 OR 2 VIEWS), LEFT (CPT=73560)  COMPARISON:  None.  INDICATIONS:  fall/left leg inj  PATIENT STATED HISTORY: (As transcribed  by Technologist)  Patient slipped on the ice and felt pull to her posterior left buttock down her left leg.    FINDINGS:  BONES:  Normal.  No significant arthropathy or acute abnormality. SOFT TISSUES:  Negative.  No visible soft tissue swelling. EFFUSION:  None visible. OTHER:  Negative.            CONCLUSION:  There is no acute abnormality in the left knee.   LOCATION:  [facility]   Dictated by (CST): Chaitanya Carr MD on 1/07/2025 at 4:18 PM     Finalized by (CST): Chaitanya Carr MD on 1/07/2025 at 4:19 PM       XR HIP W OR WO PELVIS 2 OR 3 VIEWS, LEFT (CPT=73502)    Result Date: 1/7/2025  PROCEDURE:  XR HIP W OR WO PELVIS 2 OR 3 VIEWS, LEFT (CPT=73502)  TECHNIQUE:  Unilateral 2 to 3 views of the hip and pelvis if performed.  COMPARISON:  None.  INDICATIONS:  fall/left leg inj  PATIENT STATED HISTORY: (As transcribed by Technologist)  Patient slipped on the ice and felt pull to her posterior left buttock down her left leg.    FINDINGS:  BONES:  Normal.  No significant arthropathy or acute abnormality. SOFT TISSUES:  T-shaped intrauterine contraceptive device is noted. EFFUSION:  None visible. OTHER:  Negative.            CONCLUSION:  There is no acute abnormality in the pelvis.   LOCATION:  [facility]   Dictated by ([facility]): Chaitanya Carr MD on 1/07/2025 at 4:18 PM     Finalized by ([facility]): Chaitanya Carr MD on 1/07/2025 at 4:18 PM           Site:L knee  Device:ace wrap  N/V intact: Yes        MDM         Clinical Impression: fall/LLE injury  Course of Treatment:   The Decadron will work in your system the next several days.  Recommend the additional Medrol Dosepak on day 2 or 3 if symptoms persist.  Recommend naproxen twice daily with food.  You may take a muscle relaxant before bed but allow 8 hours for operating machinery.  If symptoms persist or worsen recommend follow-up with Ortho for further evaluation and treatment.    The patient is encouraged to return if any concerning symptoms arise. Additional verbal discharge  instructions are given and discussed. Discharge medications are discussed. The patient is in good condition throughout the visit today and remains so upon discharge. I discuss the plan of care with the patient, who expresses understanding. All questions and concerns are addressed to the patient's satisfaction prior to discharge today.  Previous conversations with PCP and charts were reviewed.          Disposition and Plan     Clinical Impression:  1. Contusion of left hip, initial encounter    2. Knee injury, left, initial encounter         Disposition:  Discharge  1/7/2025  4:32 pm    Follow-up:  Charly Schmitz MD  [address] [address]  [phone]                Medications Prescribed:  Current Discharge Medication List        START taking these medications    Details   methylPREDNISolone (MEDROL) 4 MG Oral Tablet Therapy Pack Dosepack: take as directed  Qty: 1 each, Refills: 0      cyclobenzaprine 10 MG Oral Tab Take 1 tablet (10 mg total) by mouth nightly.  Qty: 20 tablet, Refills: 0      naproxen 500 MG Oral Tab Take 1 tablet (500 mg total) by mouth 2 (two) times daily as needed.  Qty: 20 tablet, Refills: 0      Elastic Bandages & Supports (KNEE COMPRESSION SLEEVE/L/XL) Does not apply Misc 1 Application daily as needed. Dx: L knee injury  Qty: 1 each, Refills: 0                 Supplementary Documentation:

## 2025-01-07 NOTE — DISCHARGE INSTRUCTIONS
Please return to the ER/clinic if symptoms worsen. Follow-up with your PCP in 24-48 hours as needed.    The Decadron will work in your system the next several days.  Recommend the additional Medrol Dosepak on day 2 or 3 if symptoms persist.  Recommend naproxen twice daily with food.  You may take a muscle relaxant before bed but allow 8 hours for operating machinery.  If symptoms persist or worsen recommend follow-up with Ortho for further evaluation and treatment.          [facility]  [address], [place] [address]  Hours:   Open ? Closes 8?PM  Phone: [phone]

## 2025-01-13 ENCOUNTER — OFFICE VISIT (OUTPATIENT)
Dept: FAMILY MEDICINE CLINIC | Facility: CLINIC | Age: 30
End: 2025-01-13
Payer: COMMERCIAL

## 2025-01-13 VITALS
TEMPERATURE: 97 F | DIASTOLIC BLOOD PRESSURE: 74 MMHG | RESPIRATION RATE: 16 BRPM | BODY MASS INDEX: 38.25 KG/M2 | HEART RATE: 100 BPM | HEIGHT: 66 IN | WEIGHT: 238 LBS | SYSTOLIC BLOOD PRESSURE: 118 MMHG

## 2025-01-13 DIAGNOSIS — M25.562 ACUTE PAIN OF LEFT KNEE: Primary | ICD-10-CM

## 2025-01-13 DIAGNOSIS — M25.552 ACUTE HIP PAIN, LEFT: ICD-10-CM

## 2025-01-13 PROCEDURE — 3074F SYST BP LT 130 MM HG: CPT | Performed by: STUDENT IN AN ORGANIZED HEALTH CARE EDUCATION/TRAINING PROGRAM

## 2025-01-13 PROCEDURE — 3008F BODY MASS INDEX DOCD: CPT | Performed by: STUDENT IN AN ORGANIZED HEALTH CARE EDUCATION/TRAINING PROGRAM

## 2025-01-13 PROCEDURE — 3078F DIAST BP <80 MM HG: CPT | Performed by: STUDENT IN AN ORGANIZED HEALTH CARE EDUCATION/TRAINING PROGRAM

## 2025-01-13 PROCEDURE — 99213 OFFICE O/P EST LOW 20 MIN: CPT | Performed by: STUDENT IN AN ORGANIZED HEALTH CARE EDUCATION/TRAINING PROGRAM

## 2025-01-13 NOTE — PROGRESS NOTES
[facility] Family [facility] Office Note    HPI:     Zak Malave is a 29 year old female presenting for f/u after fall.     Had mechanical fall after slipping on ice recently, went to urgent care on 1/7/24.     Per urgent care note:     \"29-year-old female here with complaint of having slipped on the ice yesterday.  Patient reports that she came down on her hip region twisting her left knee and she heard a pop.  Patient able to ambulate.  Patient denies muscle weakness or neurodeficits.  Patient denies chest pain, shortness of breath, cough, abdominal pain, nausea, vomiting or diarrhea.  Patient denies chest pain, shortness of breath, cough, abdominal pain, nausea, vomiting or diarrhea.  Afebrile.\"    X-rays of left knee and hip negative.      Endorses she feels about \"50% better\" after the fall. Still has some pressure/pain in left hp with some radiation down anterior left left. Has some \"tension\" when pressing on back of left knee. Some tightness of inner left thigh. Has been taking naproxen at home. Apparently has not needed muscle relaxant. She apparently started steroid dose pack today.       HISTORY:  Past Medical History:    Fracture of left leg    at age 5-8      Past Surgical History:   Procedure Laterality Date    Appendectomy      done as child around age 5 or 6    [place] teeth removed        Family History   Problem Relation Age of Onset    Heart Disease Father     Glaucoma Father     Depression Mother     Other (Rheumatoid arthritis) Maternal Grandmother     Prostate Cancer Maternal Grandfather         dx age 60s    Glaucoma Paternal Grandmother     Diabetes Paternal Grandfather     Breast Cancer Neg     Ovarian Cancer Neg     Uterine Cancer Neg     Pancreatic Cancer Neg     Colon Cancer Neg       Social History:   Social History     Socioeconomic History    Marital status: Single   Tobacco Use    Smoking status: Never     Passive exposure: Never    Smokeless tobacco: Never   Vaping Use     Vaping status: Never Used   Substance and Sexual Activity    Alcohol use: Yes     Comment: social    Drug use: Never    Sexual activity: Yes     Partners: Male     Social Drivers of Health     Food Insecurity: No Food Insecurity (1/13/2025)    NCSS - Food Insecurity     Worried About Running Out of Food in the Last Year: No     Ran Out of Food in the Last Year: No   Transportation Needs: No Transportation Needs (1/13/2025)    NCSS - Transportation     Lack of Transportation: No   Housing Stability: Unknown (1/13/2025)    NCSS - Housing/Utilities     Has Housing: Yes     Worried About Losing Housing: No        Medications (Active prior to today's visit):  Current Outpatient Medications   Medication Sig Dispense Refill    methylPREDNISolone (MEDROL) 4 MG Oral Tablet Therapy Pack Dosepack: take as directed 1 each 0    naproxen 500 MG Oral Tab Take 1 tablet (500 mg total) by mouth 2 (two) times daily as needed. 20 tablet 0    Levonorgestrel (KYLEENA IU) by Intrauterine route.      cyclobenzaprine 10 MG Oral Tab Take 1 tablet (10 mg total) by mouth nightly. (Patient not taking: Reported on 1/13/2025) 20 tablet 0    Elastic Bandages & Supports (KNEE COMPRESSION SLEEVE/L/XL) Does not apply Misc 1 Application daily as needed. Dx: L knee injury (Patient not taking: Reported on 1/13/2025) 1 each 0    fluconazole (DIFLUCAN) 150 MG Oral Tab Take one tab day one and repeat in 72 hours (Patient not taking: Reported on 1/13/2025) 2 tablet 0       Allergies:  Allergies[1]      ROS:   Review of Systems   Musculoskeletal:         +left knee and left hip pain     Otherwise see HPI    PHYSICAL EXAM:   /74   Pulse 100   Temp 97.3 °F (36.3 °C) (Temporal)   Resp 16   Ht 5' 6\" (1.676 m)   Wt 238 lb (108 kg)   LMP 12/18/2024 (Within Days)   BMI 38.41 kg/m²  Estimated body mass index is 38.41 kg/m² as calculated from the following:    Height as of this encounter: 5' 6\" (1.676 m).    Weight as of this encounter: 238 lb (108  kg).   Vital signs reviewed.Appears stated age, well groomed.    Physical Exam  Constitutional:       General: She is not in acute distress.  Musculoskeletal:      Comments: +No tenderness to palpation of knee. No significant edema appreciated of knee. ROM of knee intact. Negative anterior and posterior Lachman's test. Negative Juan's test.   +No tenderness to palpation of left hip. Negative JUAN and FADIR testing   Neurological:      Mental Status: She is alert.           ASSESSMENT/PLAN:     29 year old female presenting for f/u of left knee pain and hip pain after fall.       1. Acute pain of left knee  - provided home physical therapy exercises  - continue medrol as prescribed with muscle relaxants as needed  - if not improving after 2-4 weeks patient can contact clinic for in-person physical therapy referral     2. Acute hip pain, left  - provided home physical therapy exercises  - continue medrol as prescribed with muscle relaxants as needed  - if not improving after 2-4 weeks patient can contact clinic for in-person physical therapy referral     Follow-up: as needed    Outcome: Patient verbalizes understanding. Patient is notified to call with any questions, complications, allergies, or worsening or changing symptoms.  Patient is to call with any side effects or complications from the treatments as a result of today.     Total length of visit/charting: approximately 20 min    Charly Schmitz MD, 01/13/25, 3:34 PM      Please note that portions of this note may have been completed with a voice recognition program. Efforts were made to edit the dictations but occasionally words are mis-transcribed.         [1]   Allergies  Allergen Reactions    Seasonal OTHER (SEE COMMENTS)     sneezing

## 2025-01-15 ENCOUNTER — PATIENT MESSAGE (OUTPATIENT)
Dept: FAMILY MEDICINE CLINIC | Facility: CLINIC | Age: 30
End: 2025-01-15

## 2025-01-15 NOTE — TELEPHONE ENCOUNTER
Ok to take Famotidine? Per up to date : no interaction     Title Corticosteroids (Systemic) / Salicylates    Risk Rating C: Monitor therapy    Summary Salicylates may enhance the adverse/toxic effect of Corticosteroids (Systemic). These specifically include gastrointestinal ulceration and bleeding. Corticosteroids (Systemic) may decrease the serum concentration of Salicylates. Withdrawal of corticosteroids may result in salicylate toxicity. Severity Moderate Reliability Rating Good    Patient Management Monitor for decreased therapeutic effects of salicylates if corticosteroids are initiated/dose increased, or toxic effects if corticosteroids are discontinued/dose decreased. Monitor for gastrointestinal irritation and ulceration. For patients receiving large, chronic doses of salicylates, an a priori dosage reduction could be considered when corticosteroids are being withdrawn.

## 2025-01-16 NOTE — TELEPHONE ENCOUNTER
Agree with recommendations. And yes, please inform patient it is ok to take famotidine.     Charly Schmitz MD, 01/15/25, 10:13 PM

## 2025-01-29 ENCOUNTER — PATIENT MESSAGE (OUTPATIENT)
Dept: FAMILY MEDICINE CLINIC | Facility: CLINIC | Age: 30
End: 2025-01-29

## 2025-01-29 NOTE — TELEPHONE ENCOUNTER
Onset Sunday flu like symptoms   + covid sick exposure, family  Congestion, cough, fatigue   No fever  Gotten Rx for Paxlovid from Pharmacist, will start today    Advised supportive measures at this time--rest, hydration, Vit D, Zinc, Vit C, lozenges for sore throat, warm salty gargles, hot tea with honey/lemon, Mucinex for cough as needed, anti histamine daily as needed, saline spray, humidifier, steam bath for congestion, Tylenol alternate with Ibuprofen if with fever and body aches as needed.     Go to ER if with chest pain, SOB, worsening fatigue, low oxygen level.    Follow up if with persistent symptoms   with verbalized understanding.     Kg

## 2025-03-12 NOTE — PROGRESS NOTES
Subjective:   Zak Malave is a 29 year old female who presents for Yeast Infection (Starting 3 days ago patient noticed redness with burning but no discharge and itching.)     Pt presents for poss yeast infx    When did your symptoms begin? 3 days ago  Are you experiencing any of the following:   Foul \"fish\" vaginal odor (maybe more prevalent after intercourse and during menses)? No vaginal odor; urine smells though   Thin, watery off white vaginal discharge? No change to discharge  Vaginal itching? YES  Vulvar burning, soreness or irritation? YES  Thick, white clumpy discharge? 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    Current Outpatient Medications   Medication Sig Dispense Refill    fluconazole 150 MG Oral Tab Take 1 tablet (150 mg total) by mouth once for 1 dose. If you are still having symptoms, you can take a second tablet 72 hours after the first. 2 tablet 0    Levonorgestrel (KYLEENA IU) by Intrauterine route.      cyclobenzaprine 10 MG Oral Tab Take 1 tablet (10 mg total) by mouth nightly. (Patient not taking: Reported on 3/13/2025) 20 tablet 0    Elastic Bandages & Supports (KNEE COMPRESSION SLEEVE/L/XL) Does not apply Misc 1 Application daily as needed. Dx: L knee injury (Patient not taking: Reported on 3/13/2025) 1 each 0    fluconazole (DIFLUCAN) 150 MG Oral Tab Take one tab day one and repeat in 72 hours (Patient not taking: Reported on 10/16/2024) 2 tablet 0         Review of Systems:  Review of Systems    HENT:  Positive for congestion.    Genitourinary:  Negative for vaginal discharge and vaginal pain.        Vaginal itching   All other systems reviewed and are negative.      Objective:   /64 (BP Location: Left arm, Patient Position: Sitting, Cuff Size: adult)   Pulse 105   Resp 18   Ht 5' 6\" (1.676 m)   Wt 237 lb (107.5 kg)   LMP 12/18/2024 (Within Days)   SpO2 98%   BMI 38.25 kg/m²  Estimated body mass index is 38.25 kg/m² as calculated from the following:    Height as of this encounter: 5' 6\" (1.676 m).    Weight as of this encounter: 237 lb (107.5 kg).  Physical Exam  Vitals reviewed.   Constitutional:       Appearance: Normal appearance.   HENT:      Head: Normocephalic and atraumatic.      Mouth/Throat:      Mouth: Mucous membranes are moist.   Eyes:      General: No scleral icterus.        Right eye: No discharge.         Left eye: No discharge.      Conjunctiva/sclera: Conjunctivae normal.   Cardiovascular:      Rate and Rhythm: Normal rate.      Pulses: Normal pulses.   Pulmonary:      Effort: Pulmonary effort is normal.   Skin:     General: Skin is warm and dry.   Neurological:      General: No focal deficit present.      Mental Status: She is alert and oriented to person, place, and time.   Psychiatric:         Mood and Affect: Mood normal.         Behavior: Behavior normal.         Thought Content: Thought content normal.         Judgment: Judgment normal.         Assessment & Plan:   1. Vaginal candidiasis (Primary)  2. Abnormal urine odor  -     Cancel: Urine Dip, auto without Micro  3. Itching of vulva  -     Cancel: Urine Dip, auto without Micro  4. Sore throat  -     SARS-COV-22-ALINITY; Future; Expected date: 03/13/2025  -     SARS-COV-22-ALINITY  5. Congestion of throat  -     SARS-COV-22-ALINITY; Future; Expected date: 03/13/2025  -     SARS-COV-22-ALINITY  Other orders  -     Fluconazole; Take 1 tablet (150 mg total) by mouth once for 1 dose. If you are still having symptoms, you  can take a second tablet 72 hours after the first.  Dispense: 2 tablet; Refill: 0  Unable to void for urine dip.   Will tx clinically as candida; pt states she gets somewhat frequently and sx are same as her \"norm\". I did offer that if she gets another yeast overgrowth, I would consider sending an extra dose or 2 of fluconazole to pharmacy.   Viral quad panel swab done. Will notify with results.   If no improvement in sx, return for vaginal swab and/or UA.   Patient verbalizes understanding and is agreeable to plan.         No follow-ups on file.    Rhonda Will, APRN, 3/12/2025, 11:14 AM

## 2025-03-13 ENCOUNTER — OFFICE VISIT (OUTPATIENT)
Dept: FAMILY MEDICINE CLINIC | Facility: CLINIC | Age: 30
End: 2025-03-13
Payer: COMMERCIAL

## 2025-03-13 VITALS
RESPIRATION RATE: 18 BRPM | OXYGEN SATURATION: 98 % | HEIGHT: 66 IN | WEIGHT: 237 LBS | BODY MASS INDEX: 38.09 KG/M2 | SYSTOLIC BLOOD PRESSURE: 118 MMHG | HEART RATE: 105 BPM | DIASTOLIC BLOOD PRESSURE: 64 MMHG

## 2025-03-13 DIAGNOSIS — R68.89 CONGESTION OF THROAT: ICD-10-CM

## 2025-03-13 DIAGNOSIS — B37.31 VAGINAL CANDIDIASIS: Primary | ICD-10-CM

## 2025-03-13 DIAGNOSIS — R82.90 ABNORMAL URINE ODOR: ICD-10-CM

## 2025-03-13 DIAGNOSIS — J02.9 SORE THROAT: ICD-10-CM

## 2025-03-13 DIAGNOSIS — L29.2 ITCHING OF VULVA: ICD-10-CM

## 2025-03-13 PROCEDURE — 3008F BODY MASS INDEX DOCD: CPT

## 2025-03-13 PROCEDURE — 87637 SARSCOV2&INF A&B&RSV AMP PRB: CPT

## 2025-03-13 PROCEDURE — 3078F DIAST BP <80 MM HG: CPT

## 2025-03-13 PROCEDURE — 3074F SYST BP LT 130 MM HG: CPT

## 2025-03-13 PROCEDURE — 99213 OFFICE O/P EST LOW 20 MIN: CPT

## 2025-03-13 RX ORDER — FLUCONAZOLE 150 MG/1
150 TABLET ORAL ONCE
Qty: 2 TABLET | Refills: 0 | Status: SHIPPED | OUTPATIENT
Start: 2025-03-13 | End: 2025-03-13

## 2025-03-14 LAB
FLUAV + FLUBV RNA SPEC NAA+PROBE: NOT DETECTED
FLUAV + FLUBV RNA SPEC NAA+PROBE: NOT DETECTED
RSV RNA SPEC NAA+PROBE: NOT DETECTED
SARS-COV-2 RNA RESP QL NAA+PROBE: NOT DETECTED

## 2025-04-22 ENCOUNTER — OFFICE VISIT (OUTPATIENT)
Dept: FAMILY MEDICINE CLINIC | Facility: CLINIC | Age: 30
End: 2025-04-22
Payer: COMMERCIAL

## 2025-04-22 VITALS
SYSTOLIC BLOOD PRESSURE: 110 MMHG | BODY MASS INDEX: 37.45 KG/M2 | HEART RATE: 93 BPM | RESPIRATION RATE: 22 BRPM | TEMPERATURE: 99 F | HEIGHT: 66 IN | DIASTOLIC BLOOD PRESSURE: 68 MMHG | OXYGEN SATURATION: 97 % | WEIGHT: 233 LBS

## 2025-04-22 DIAGNOSIS — J06.9 VIRAL URI WITH COUGH: Primary | ICD-10-CM

## 2025-04-22 PROCEDURE — 99213 OFFICE O/P EST LOW 20 MIN: CPT | Performed by: STUDENT IN AN ORGANIZED HEALTH CARE EDUCATION/TRAINING PROGRAM

## 2025-04-22 PROCEDURE — 87637 SARSCOV2&INF A&B&RSV AMP PRB: CPT | Performed by: STUDENT IN AN ORGANIZED HEALTH CARE EDUCATION/TRAINING PROGRAM

## 2025-04-22 PROCEDURE — 3074F SYST BP LT 130 MM HG: CPT | Performed by: STUDENT IN AN ORGANIZED HEALTH CARE EDUCATION/TRAINING PROGRAM

## 2025-04-22 PROCEDURE — 3008F BODY MASS INDEX DOCD: CPT | Performed by: STUDENT IN AN ORGANIZED HEALTH CARE EDUCATION/TRAINING PROGRAM

## 2025-04-22 PROCEDURE — 3078F DIAST BP <80 MM HG: CPT | Performed by: STUDENT IN AN ORGANIZED HEALTH CARE EDUCATION/TRAINING PROGRAM

## 2025-04-22 RX ORDER — FLUTICASONE PROPIONATE 50 MCG
2 SPRAY, SUSPENSION (ML) NASAL DAILY PRN
Qty: 16 G | Refills: 0 | Status: SHIPPED | OUTPATIENT
Start: 2025-04-22

## 2025-04-22 RX ORDER — AZELASTINE 1 MG/ML
2 SPRAY, METERED NASAL NIGHTLY PRN
Qty: 30 ML | Refills: 0 | Status: SHIPPED | OUTPATIENT
Start: 2025-04-22

## 2025-04-22 NOTE — PROGRESS NOTES
[facility] Family Medicine Office Note    HPI:     Zak Malave is a 30 year old female presenting for cough.     Started about 5-6 days ago. Noted sore throat, headaches, cough (mild phlegm), congestion in chest, body aches, chills. Taking NyQuil at home. Has been around sick contacts (mother and brother). Denies any recent travel. Temp at home normal.    HISTORY:  Past Medical History[1]   Past Surgical History[2]   Family History[3]   Social History: Short Social Hx on File[4]     Medications (Active prior to today's visit):  Current Medications[5]    Allergies:  Allergies[6]      ROS:   Review of Systems   Constitutional:  Negative for fever.   HENT:  Positive for congestion.    Respiratory:  Positive for cough.    Musculoskeletal:  Positive for myalgias.     Otherwise see HPI    PHYSICAL EXAM:   /68   Pulse 93   Temp 98.6 °F (37 °C)   Resp 22   Ht 5' 6\" (1.676 m)   Wt 233 lb (105.7 kg)   LMP 12/18/2024 (Within Days)   SpO2 97%   BMI 37.61 kg/m²  Estimated body mass index is 37.61 kg/m² as calculated from the following:    Height as of this encounter: 5' 6\" (1.676 m).    Weight as of this encounter: 233 lb (105.7 kg).   Vital signs reviewed.Appears stated age, well groomed.    Physical Exam  Constitutional:       General: She is not in acute distress.  HENT:      Nose: Congestion present.      Comments: +no sinus tenderness to palpation     Mouth/Throat:      Comments: +mild mucus in posterior oropharynx  Cardiovascular:      Rate and Rhythm: Normal rate and regular rhythm.      Heart sounds: Normal heart sounds.   Pulmonary:      Effort: Pulmonary effort is normal.      Breath sounds: Normal breath sounds.   Neurological:      Mental Status: She is alert.           ASSESSMENT/PLAN:     30 year old female presenting for likely viral URI.     1. Viral URI with cough  - Recommended adequate hydration, humidifier use, honey/lemon mixture, steam inhalation, etc.   - SARS-CoV-2/Flu A  and B/RSV by PCR ([facility]); Future  - fluticasone propionate 50 MCG/ACT Nasal Suspension; 2 sprays by Each Nare route daily as needed (for sinus congestion).  Dispense: 16 g; Refill: 0  - azelastine 0.1 % Nasal Solution; 2 sprays by Nasal route nightly as needed (for sinus congestion).  Dispense: 30 mL; Refill: 0  - SARS-CoV-2/Flu A and B/RSV by PCR ([facility])    Follow-up: as needed    Outcome: Patient verbalizes understanding. Patient is notified to call with any questions, complications, allergies, or worsening or changing symptoms.  Patient is to call with any side effects or complications from the treatments as a result of today.     Total length of visit/charting: approximately 20 min    Charly Schmitz MD, 04/22/25, 11:02 AM      Please note that portions of this note may have been completed with a voice recognition program. Efforts were made to edit the dictations but occasionally words are mis-transcribed.       [1]   Past Medical History:   Fracture of left leg    at age 7-8   [2]   Past Surgical History:  Procedure Laterality Date    Appendectomy      done as child around age 5 or 6    [place] teeth removed     [3]   Family History  Problem Relation Age of Onset    Heart Disease Father     Glaucoma Father     Depression Mother     Other (Rheumatoid arthritis) Maternal Grandmother     Prostate Cancer Maternal Grandfather         dx age 60s    Glaucoma Paternal Grandmother     Diabetes Paternal Grandfather     Breast Cancer Neg     Ovarian Cancer Neg     Uterine Cancer Neg     Pancreatic Cancer Neg     Colon Cancer Neg    [4]   Social History  Socioeconomic History    Marital status: Single   Tobacco Use    Smoking status: Never     Passive exposure: Never    Smokeless tobacco: Never   Vaping Use    Vaping status: Never Used   Substance and Sexual Activity    Alcohol use: Yes     Comment: social    Drug use: Never    Sexual activity: Yes     Partners: Male     Social Drivers of Health     Food Insecurity: No  Food Insecurity (1/13/2025)    NCSS - Food Insecurity     Worried About Running Out of Food in the Last Year: No     Ran Out of Food in the Last Year: No   Transportation Needs: No Transportation Needs (1/13/2025)    NCSS - Transportation     Lack of Transportation: No   Housing Stability: Unknown (1/13/2025)    NCSS - Housing/Utilities     Has Housing: Yes     Worried About Losing Housing: No   [5]   Current Outpatient Medications   Medication Sig Dispense Refill    fluticasone propionate 50 MCG/ACT Nasal Suspension 2 sprays by Each Nare route daily as needed (for sinus congestion). 16 g 0    azelastine 0.1 % Nasal Solution 2 sprays by Nasal route nightly as needed (for sinus congestion). 30 mL 0    Levonorgestrel (KYLEENA IU) by Intrauterine route.     [6]   Allergies  Allergen Reactions    Seasonal OTHER (SEE COMMENTS)     sneezing

## 2025-04-22 NOTE — PATIENT INSTRUCTIONS
1) Take fluticasone nasal spray (aka Flonase) in the morning for 7-10 days    2) Take azelastine nasal spray at night for 7-10 days    3) Drink water regularly     4) Use a humidifier overnight    5) Try steam inhalation daily     6) Try honey/lemon mix    7) Use albuterol every 4-6 hours as needed for persistent cough, shortness of breath, and/or wheezing    8) Try DayQuil Vapocool over the counter (available at most pharmacies) or Mucinex

## 2025-04-30 ENCOUNTER — OFFICE VISIT (OUTPATIENT)
Dept: FAMILY MEDICINE CLINIC | Facility: CLINIC | Age: 30
End: 2025-04-30
Payer: COMMERCIAL

## 2025-04-30 VITALS
RESPIRATION RATE: 16 BRPM | TEMPERATURE: 98 F | SYSTOLIC BLOOD PRESSURE: 114 MMHG | WEIGHT: 230 LBS | OXYGEN SATURATION: 98 % | DIASTOLIC BLOOD PRESSURE: 80 MMHG | BODY MASS INDEX: 37 KG/M2 | HEART RATE: 79 BPM

## 2025-04-30 DIAGNOSIS — N89.8 VAGINAL DISCHARGE: Primary | ICD-10-CM

## 2025-04-30 PROCEDURE — 3074F SYST BP LT 130 MM HG: CPT | Performed by: PHYSICIAN ASSISTANT

## 2025-04-30 PROCEDURE — 3079F DIAST BP 80-89 MM HG: CPT | Performed by: PHYSICIAN ASSISTANT

## 2025-04-30 PROCEDURE — 81514 NFCT DS BV&VAGINITIS DNA ALG: CPT | Performed by: PHYSICIAN ASSISTANT

## 2025-04-30 PROCEDURE — 99213 OFFICE O/P EST LOW 20 MIN: CPT | Performed by: PHYSICIAN ASSISTANT

## 2025-04-30 RX ORDER — MULTIVIT-MIN/IRON/FOLIC ACID/K 18-600-40
CAPSULE ORAL
COMMUNITY

## 2025-04-30 NOTE — PATIENT INSTRUCTIONS
Will call or MyChart with test results and treat as needed   Close GYN or PCP follow up if reoccurring symptoms persist

## 2025-04-30 NOTE — PROGRESS NOTES
HPI:   Zak Malave is a 30 year old female who presents for vaginal symptoms for 2 days. Thin clear vaginal discharge. + vaginal itching. No odor. No redness or rash. No urinary symptoms. No abdominal or back pain. No nausea or vomiting. No fever. Last period two weeks ago- notes similar symptoms after every period. No new partners or STI concern. No recent antibiotic use. No OTC medications used       Current Medications[1]   Past Medical History[2]   Past Surgical History[3]   Family History[4]   Social History:   Short Social Hx on File[5]     REVIEW OF SYSTEMS:   GENERAL: no fatigue, fever, chills.  SKIN: denies any unusual skin lesions  GI: denies abdominal pain,denies heartburn  : as above  MUSCULOSKELETAL: denies back pain    EXAM:   /80   Pulse 79   Temp 98.1 °F (36.7 °C) (Oral)   Resp 16   Wt 230 lb (104.3 kg)   LMP 04/17/2025 (Within Days)   SpO2 98%   BMI 37.12 kg/m²   Body mass index is 37.12 kg/m².   Physical Exam  Chaperone present: declined chaperone.   Constitutional:       General: She is not in acute distress.     Appearance: Normal appearance.   HENT:      Head: Normocephalic and atraumatic.   Cardiovascular:      Rate and Rhythm: Normal rate and regular rhythm.      Heart sounds: Normal heart sounds. No murmur heard.  Pulmonary:      Effort: Pulmonary effort is normal.      Breath sounds: Normal breath sounds. No wheezing or rhonchi.   Abdominal:      General: Abdomen is flat. Bowel sounds are normal. There is no distension.      Palpations: Abdomen is soft. There is no mass.      Tenderness: There is no abdominal tenderness. There is no right CVA tenderness, left CVA tenderness or guarding.   Genitourinary:     Labia:         Right: No rash, tenderness or lesion.         Left: No rash, tenderness or lesion.       Vagina: Normal.      Cervix: Discharge (yellow discharge) present.   Neurological:      Mental Status: She is alert.           ASSESSMENT AND PLAN:     Zak MANCINI  Ananda is a 30 year old female presents with UTI symptoms.    ASSESSMENT:  Encounter Diagnosis   Name Primary?    Vaginal discharge Yes     Await results and treat as needed     PLAN: Comfort measures as described in Patient Instructions.       The patient indicates understanding of these issues and agrees to the plan.  The patient is asked to see PCP in 3 days if not better. Seek care immediately for new onset of fever, vomiting, worsening symptoms.    Patient Instructions   Will call or MyChart with test results and treat as needed   Close GYN or PCP follow up if reoccurring symptoms persist            [1]   Current Outpatient Medications   Medication Sig Dispense Refill    Cholecalciferol (VITAMIN D) 50 MCG (2000 UT) Oral Cap Take by mouth.      fluticasone propionate 50 MCG/ACT Nasal Suspension 2 sprays by Each Nare route daily as needed (for sinus congestion). 16 g 0    azelastine 0.1 % Nasal Solution 2 sprays by Nasal route nightly as needed (for sinus congestion). 30 mL 0    Levonorgestrel (KYLEENA IU) by Intrauterine route.     [2]   Past Medical History:   Fracture of left leg    at age 7-8   [3]   Past Surgical History:  Procedure Laterality Date    Appendectomy      done as child around age 5 or 6    [place] teeth removed     [4]   Family History  Problem Relation Age of Onset    Heart Disease Father     Glaucoma Father     Depression Mother     Other (Rheumatoid arthritis) Maternal Grandmother     Prostate Cancer Maternal Grandfather         dx age 60s    Glaucoma Paternal Grandmother     Diabetes Paternal Grandfather     Breast Cancer Neg     Ovarian Cancer Neg     Uterine Cancer Neg     Pancreatic Cancer Neg     Colon Cancer Neg    [5]   Social History  Socioeconomic History    Marital status: Single   Tobacco Use    Smoking status: Never     Passive exposure: Never    Smokeless tobacco: Never   Vaping Use    Vaping status: Never Used   Substance and Sexual Activity    Alcohol use: Yes     Comment:  social    Drug use: Never    Sexual activity: Yes     Partners: Male     Social Drivers of Health     Food Insecurity: No Food Insecurity (1/13/2025)    NCSS - Food Insecurity     Worried About Running Out of Food in the Last Year: No     Ran Out of Food in the Last Year: No   Transportation Needs: No Transportation Needs (1/13/2025)    NCSS - Transportation     Lack of Transportation: No   Housing Stability: Unknown (1/13/2025)    NCSS - Housing/Utilities     Has Housing: Yes     Worried About Losing Housing: No

## 2025-07-15 ENCOUNTER — OFFICE VISIT (OUTPATIENT)
Facility: CLINIC | Age: 30
End: 2025-07-15
Payer: COMMERCIAL

## 2025-07-15 VITALS
WEIGHT: 233.63 LBS | HEIGHT: 66 IN | DIASTOLIC BLOOD PRESSURE: 70 MMHG | HEART RATE: 70 BPM | SYSTOLIC BLOOD PRESSURE: 120 MMHG | BODY MASS INDEX: 37.55 KG/M2

## 2025-07-15 DIAGNOSIS — N94.89 VAGINAL BURNING: ICD-10-CM

## 2025-07-15 DIAGNOSIS — Z30.09 COUNSELING FOR BIRTH CONTROL REGARDING INTRAUTERINE DEVICE (IUD): ICD-10-CM

## 2025-07-15 DIAGNOSIS — N76.0 ACUTE VAGINITIS: ICD-10-CM

## 2025-07-15 DIAGNOSIS — Z01.419 ENCOUNTER FOR WELL WOMAN EXAM WITH ROUTINE GYNECOLOGICAL EXAM: Primary | ICD-10-CM

## 2025-07-15 LAB
APPEARANCE: CLEAR
BILIRUBIN: NEGATIVE
GLUCOSE (URINE DIPSTICK): NEGATIVE MG/DL
KETONES (URINE DIPSTICK): NEGATIVE MG/DL
LEUKOCYTES: NEGATIVE
MULTISTIX LOT#: ABNORMAL NUMERIC
NITRITE, URINE: NEGATIVE
PH, URINE: 5.5 (ref 4.5–8)
PROTEIN (URINE DIPSTICK): NEGATIVE MG/DL
SPECIFIC GRAVITY: 1.02 (ref 1–1.03)
URINE-COLOR: YELLOW
UROBILINOGEN,SEMI-QN: 0.2 MG/DL (ref 0–1.9)

## 2025-07-15 PROCEDURE — 3078F DIAST BP <80 MM HG: CPT

## 2025-07-15 PROCEDURE — 87086 URINE CULTURE/COLONY COUNT: CPT

## 2025-07-15 PROCEDURE — 3008F BODY MASS INDEX DOCD: CPT

## 2025-07-15 PROCEDURE — 99459 PELVIC EXAMINATION: CPT

## 2025-07-15 PROCEDURE — 99395 PREV VISIT EST AGE 18-39: CPT

## 2025-07-15 PROCEDURE — 81514 NFCT DS BV&VAGINITIS DNA ALG: CPT

## 2025-07-15 PROCEDURE — 81002 URINALYSIS NONAUTO W/O SCOPE: CPT

## 2025-07-15 PROCEDURE — 3074F SYST BP LT 130 MM HG: CPT

## 2025-07-15 RX ORDER — MULTIVITAMIN
1 TABLET ORAL DAILY
COMMUNITY

## 2025-07-15 RX ORDER — MISOPROSTOL 200 UG/1
TABLET ORAL
Qty: 2 TABLET | Refills: 0 | Status: SHIPPED | OUTPATIENT
Start: 2025-07-15

## 2025-07-15 NOTE — PROGRESS NOTES
Excuse Slip    Madi Cardenas,           This is to certify that the above-named patient had an appointment at this office for professional attention on August 15, 2022.    Please excuse him:  (X)    FROM:   (X)    DUE TO:        Work      Injury       School      Illness       Gym      Other       Other        Comments:    · Activity as tolerated  · Cannot be using the right hand to lift, carry anything until released by Orthopedics/hand surgery    Thank you,          Sabino Knight [place] [address]   Zak Malave is a 30 year old female  Patient's last menstrual period was 2025 (exact date).   Chief Complaint   Patient presents with    Wellness Visit    Other     Reviewed Preventative/Wellness form with patient    Gyn Problem     Pt c/o burning after urination sometimes.   .  PCP: Dr. Schmitz  Last Pap: 23 HPV neg, Pap normal   HPV vaccine: up to date     Having vaginal burning. No dysuria. Able to empty completely.     Menses: flow lighter, bleeds monthly,  Her Kyleena is due to be changed. She would like the Kyleena again. It was inserted at [facility].     OBSTETRICS HISTORY:  OB History    Para Term  AB Living   0 0 0 0 0 0   SAB IAB Ectopic Multiple Live Births   0 0 0 0 0       GYNE HISTORY:  Periods spotting only    History   Sexual Activity    Sexual activity: Yes    Partners: Male    Birth control/ protection: I.U.D.     Comment: kyleena inserted 20        Pap Date: 23  Pap Result Notes: neg/neg        MEDICAL HISTORY:  Past Medical History[1]    SURGICAL HISTORY:  Past Surgical History[2]    SOCIAL HISTORY:  Social History     Socioeconomic History    Marital status: Single     Spouse name: Not on file    Number of children: Not on file    Years of education: Not on file    Highest education level: Not on file   Occupational History    Not on file   Tobacco Use    Smoking status: Never     Passive exposure: Never    Smokeless tobacco: Never   Vaping Use    Vaping status: Never Used   Substance and Sexual Activity    Alcohol use: Yes     Comment: social    Drug use: Never    Sexual activity: Yes     Partners: Male     Birth control/protection: I.U.D.     Comment: kyleena inserted 20   Other Topics Concern    Not on file   Social History Narrative    Not on file     Social Drivers of Health     Food Insecurity: No Food Insecurity (2025)    NCSS - Food Insecurity     Worried About Running Out of Food in the Last Year: No     Ran Out of  Food in the Last Year: No   Transportation Needs: No Transportation Needs (1/13/2025)    NCSS - Transportation     Lack of Transportation: No   Stress: Not on file   Housing Stability: Unknown (1/13/2025)    NCSS - Housing/Utilities     Has Housing: Yes     Worried About Losing Housing: No     Unable to Get Utilities: Not on file       FAMILY HISTORY:  Family History[3]    MEDICATIONS:  Medications - Current[4]    ALLERGIES:  Allergies[5]      Review of Systems:  Constitutional:  Denies fatigue, night sweats, hot flashes  Eyes:  denies blurred or double vision  Cardiovascular:  denies chest pain or palpitations  Respiratory:  denies shortness of breath  Gastrointestinal:  denies heartburn, abdominal pain, diarrhea or constipation  Genitourinary:  denies dysuria, incontinence, abnormal vaginal discharge, vaginal itching  Musculoskeletal:  denies back pain.  Skin/Breast:  Denies any breast pain, lumps, or discharge.   Neurological:  denies headaches, extremity weakness or numbness.  Psychiatric: denies depression or anxiety.  Endocrine:   denies excessive thirst or urination.  Heme/Lymph:  denies history of anemia, easy bruising or bleeding.      PHYSICAL EXAM:   Constitutional: well developed, well nourished  Head/Face: normocephalic  Neck/Thyroid: thyroid symmetric, no thyromegaly, no nodules, no adenopathy  Lymphatic:no abnormal supraclavicular or axillary adenopathy is noted  Breast: normal without palpable masses, tenderness, asymmetry, nipple discharge, nipple retraction or skin changes  Abdomen:  soft, nontender, nondistended, no masses  Skin/Hair: no unusual rashes or bruises  Extremities: no edema, no cyanosis  Psychiatric:  Oriented to time, place, person and situation. Appropriate mood and affect    Pelvic Exam:  External Genitalia: normal appearance, hair distribution, and no lesions  Urethral Meatus:  normal in size, location, without lesions and prolapse  Bladder:  No fullness, masses or  tenderness  Vagina:  Normal appearance without lesions, no abnormal discharge  Cervix:  Normal without tenderness on motion, blue strings seen   Uterus: normal in size, contour, position, mobility, without tenderness  Adnexa: normal without masses or tenderness  Perineum: normal  Anus: no hemorroids     Assessment & Plan:  Diagnoses and all orders for this visit:    Encounter for well woman exam with routine gynecological exam    Acute vaginitis  -     Vaginitis Vaginosis PCR Panel; Future    Vaginal burning  -     Urine Dip in office [[id number]]  -     Culture Urine; Future    Counseling for birth control regarding intrauterine device (IUD)  -     miSOPROStol 200 MCG Oral Tab; Take one tablets orally the night before the procedure and one tablet orally the day of the procedure.      Discussed with patient:  1) Her Kyleena is due to be changed, she would like another Kyleena. Advise her to make an appointment with Ob's. Will send Cytotec to her pharmacy with instructions.              [1]   Past Medical History:   Fracture of left leg    at age 7-8   [2]   Past Surgical History:  Procedure Laterality Date    Appendectomy      done as child around age 5 or 6    [place] teeth removed     [3]   Family History  Problem Relation Age of Onset    Heart Disease Father     Glaucoma Father     Depression Mother     Other (Rheumatoid arthritis) Maternal Grandmother     Prostate Cancer Maternal Grandfather         dx age 60s    Glaucoma Paternal Grandmother     Diabetes Paternal Grandfather     Breast Cancer Neg     Ovarian Cancer Neg     Uterine Cancer Neg     Pancreatic Cancer Neg     Colon Cancer Neg    [4]   Current Outpatient Medications:     Multiple Vitamin (ONE-DAILY MULTI VITAMINS) Oral Tab, Take 1 tablet by mouth daily., Disp: , Rfl:     miSOPROStol 200 MCG Oral Tab, Take one tablets orally the night before the procedure and one tablet orally the day of the procedure., Disp: 2 tablet, Rfl: 0    Cholecalciferol (VITAMIN D)  50 MCG (2000 UT) Oral Cap, Take by mouth., Disp: , Rfl:     fluticasone propionate 50 MCG/ACT Nasal Suspension, 2 sprays by Each Nare route daily as needed (for sinus congestion). (Patient not taking: Reported on 7/15/2025), Disp: 16 g, Rfl: 0    azelastine 0.1 % Nasal Solution, 2 sprays by Nasal route nightly as needed (for sinus congestion). (Patient not taking: Reported on 7/15/2025), Disp: 30 mL, Rfl: 0    Levonorgestrel (KYLEENA IU), by Intrauterine route., Disp: , Rfl:   [5]   Allergies  Allergen Reactions    Seasonal OTHER (SEE COMMENTS)     sneezing

## 2025-08-25 ENCOUNTER — OFFICE VISIT (OUTPATIENT)
Dept: FAMILY MEDICINE CLINIC | Facility: CLINIC | Age: 30
End: 2025-08-25

## 2025-08-25 VITALS
TEMPERATURE: 98 F | SYSTOLIC BLOOD PRESSURE: 112 MMHG | HEIGHT: 66 IN | OXYGEN SATURATION: 97 % | DIASTOLIC BLOOD PRESSURE: 72 MMHG | HEART RATE: 90 BPM | BODY MASS INDEX: 36.96 KG/M2 | WEIGHT: 230 LBS | RESPIRATION RATE: 16 BRPM

## 2025-08-25 DIAGNOSIS — B37.31 VAGINAL YEAST INFECTION: Primary | ICD-10-CM

## 2025-08-25 PROCEDURE — 99213 OFFICE O/P EST LOW 20 MIN: CPT | Performed by: FAMILY MEDICINE

## 2025-08-25 PROCEDURE — 3008F BODY MASS INDEX DOCD: CPT | Performed by: FAMILY MEDICINE

## 2025-08-25 PROCEDURE — 3078F DIAST BP <80 MM HG: CPT | Performed by: FAMILY MEDICINE

## 2025-08-25 PROCEDURE — 3074F SYST BP LT 130 MM HG: CPT | Performed by: FAMILY MEDICINE

## 2025-08-25 RX ORDER — FLUCONAZOLE 150 MG/1
TABLET ORAL
Qty: 2 TABLET | Refills: 0 | Status: SHIPPED | OUTPATIENT
Start: 2025-08-25